# Patient Record
Sex: FEMALE | Race: WHITE | URBAN - METROPOLITAN AREA
[De-identification: names, ages, dates, MRNs, and addresses within clinical notes are randomized per-mention and may not be internally consistent; named-entity substitution may affect disease eponyms.]

---

## 2017-04-20 ENCOUNTER — TELEPHONE (OUTPATIENT)
Dept: INTERNAL MEDICINE | Age: 52
End: 2017-04-20

## 2017-04-20 RX ORDER — AMOXICILLIN AND CLAVULANATE POTASSIUM 875; 125 MG/1; MG/1
1 TABLET, FILM COATED ORAL 2 TIMES DAILY
Qty: 20 TABLET | Refills: 0 | Status: SHIPPED | OUTPATIENT
Start: 2017-04-20 | End: 2017-04-30

## 2017-06-02 ENCOUNTER — TELEPHONE (OUTPATIENT)
Dept: INTERNAL MEDICINE | Age: 52
End: 2017-06-02

## 2017-06-06 ENCOUNTER — OFFICE VISIT (OUTPATIENT)
Dept: INTERNAL MEDICINE | Age: 52
End: 2017-06-06

## 2017-06-06 VITALS
HEIGHT: 65 IN | HEART RATE: 80 BPM | SYSTOLIC BLOOD PRESSURE: 108 MMHG | BODY MASS INDEX: 31.32 KG/M2 | WEIGHT: 188 LBS | DIASTOLIC BLOOD PRESSURE: 80 MMHG

## 2017-06-06 DIAGNOSIS — F43.9 STRESS: ICD-10-CM

## 2017-06-06 DIAGNOSIS — M94.262 CHONDROMALACIA, LEFT KNEE: ICD-10-CM

## 2017-06-06 DIAGNOSIS — N95.1 MENOPAUSAL SYMPTOMS: ICD-10-CM

## 2017-06-06 DIAGNOSIS — K64.9 HEMORRHOIDS, UNSPECIFIED HEMORRHOID TYPE: ICD-10-CM

## 2017-06-06 PROCEDURE — 99213 OFFICE O/P EST LOW 20 MIN: CPT | Performed by: INTERNAL MEDICINE

## 2017-06-06 RX ORDER — CITALOPRAM 20 MG/1
TABLET ORAL
Qty: 90 TABLET | Refills: 3 | Status: SHIPPED | OUTPATIENT
Start: 2017-06-06 | End: 2018-06-12 | Stop reason: SDUPTHER

## 2017-06-06 ASSESSMENT — PATIENT HEALTH QUESTIONNAIRE - PHQ9
2. FEELING DOWN, DEPRESSED OR HOPELESS: 0
SUM OF ALL RESPONSES TO PHQ QUESTIONS 1-9: 0
SUM OF ALL RESPONSES TO PHQ9 QUESTIONS 1 & 2: 0
1. LITTLE INTEREST OR PLEASURE IN DOING THINGS: 0

## 2017-08-23 ENCOUNTER — OFFICE VISIT (OUTPATIENT)
Dept: INTERNAL MEDICINE | Age: 52
End: 2017-08-23

## 2017-08-23 VITALS
WEIGHT: 191 LBS | HEART RATE: 68 BPM | SYSTOLIC BLOOD PRESSURE: 108 MMHG | BODY MASS INDEX: 31.82 KG/M2 | DIASTOLIC BLOOD PRESSURE: 68 MMHG | HEIGHT: 65 IN

## 2017-08-23 DIAGNOSIS — J45.20 MILD INTERMITTENT ASTHMA WITHOUT COMPLICATION: ICD-10-CM

## 2017-08-23 DIAGNOSIS — M94.262 CHONDROMALACIA, LEFT KNEE: ICD-10-CM

## 2017-08-23 DIAGNOSIS — F43.9 STRESS: ICD-10-CM

## 2017-08-23 DIAGNOSIS — N95.1 MENOPAUSAL SYMPTOMS: ICD-10-CM

## 2017-08-23 DIAGNOSIS — Z00.00 PREVENTATIVE HEALTH CARE: Primary | ICD-10-CM

## 2017-08-23 DIAGNOSIS — Z23 NEED FOR TDAP VACCINATION: ICD-10-CM

## 2017-08-23 PROCEDURE — 90471 IMMUNIZATION ADMIN: CPT | Performed by: INTERNAL MEDICINE

## 2017-08-23 PROCEDURE — 90715 TDAP VACCINE 7 YRS/> IM: CPT | Performed by: INTERNAL MEDICINE

## 2017-08-23 PROCEDURE — 99396 PREV VISIT EST AGE 40-64: CPT | Performed by: INTERNAL MEDICINE

## 2017-08-23 ASSESSMENT — ENCOUNTER SYMPTOMS
BACK PAIN: 0
GASTROINTESTINAL NEGATIVE: 1
RESPIRATORY NEGATIVE: 1
ALLERGIC/IMMUNOLOGIC NEGATIVE: 1

## 2017-11-09 ENCOUNTER — TELEPHONE (OUTPATIENT)
Dept: INTERNAL MEDICINE | Age: 52
End: 2017-11-09

## 2017-11-09 RX ORDER — PREDNISONE 10 MG/1
TABLET ORAL
Qty: 22 TABLET | Refills: 0 | Status: SHIPPED | OUTPATIENT
Start: 2017-11-09 | End: 2019-07-10

## 2017-11-09 RX ORDER — TRIAMCINOLONE ACETONIDE 5 MG/G
CREAM TOPICAL
Qty: 30 G | Refills: 0 | Status: SHIPPED | OUTPATIENT
Start: 2017-11-09 | End: 2017-11-16

## 2017-11-20 ENCOUNTER — TELEPHONE (OUTPATIENT)
Dept: INTERNAL MEDICINE | Age: 52
End: 2017-11-20

## 2017-11-20 RX ORDER — BETAMETHASONE DIPROPIONATE 0.05 %
OINTMENT (GRAM) TOPICAL
Qty: 30 G | Refills: 0 | Status: SHIPPED | OUTPATIENT
Start: 2017-11-20 | End: 2019-07-10

## 2017-11-20 RX ORDER — PREDNISONE 10 MG/1
TABLET ORAL
Qty: 30 TABLET | Refills: 0 | Status: SHIPPED | OUTPATIENT
Start: 2017-11-20 | End: 2019-07-10

## 2017-11-20 NOTE — TELEPHONE ENCOUNTER
Prednisone 10 mg   #30     4 daily ×3 days then 3 daily ×3 days and 2 daily ×3 days and one daily till gone.   Diprolene AF cream         30 g           apply twice a day to affected areas

## 2017-12-05 ENCOUNTER — TELEPHONE (OUTPATIENT)
Dept: INTERNAL MEDICINE | Age: 52
End: 2017-12-05

## 2017-12-05 NOTE — TELEPHONE ENCOUNTER
Poison ivy should not be lasting this long. We'll give one more course of a topical steroid if no better she will have to see dermatology.   Lidex 0.1% cream        15 g            apply twice a day

## 2018-06-12 RX ORDER — CITALOPRAM 20 MG/1
TABLET ORAL
Qty: 30 TABLET | Refills: 2 | Status: SHIPPED | OUTPATIENT
Start: 2018-06-12 | End: 2018-09-21 | Stop reason: SDUPTHER

## 2018-09-21 RX ORDER — CITALOPRAM 20 MG/1
TABLET ORAL
Qty: 30 TABLET | Refills: 1 | Status: SHIPPED | OUTPATIENT
Start: 2018-09-21 | End: 2018-11-21 | Stop reason: SDUPTHER

## 2018-09-26 PROBLEM — Z00.00 PREVENTATIVE HEALTH CARE: Status: RESOLVED | Noted: 2017-08-23 | Resolved: 2018-09-26

## 2018-11-21 ENCOUNTER — TELEPHONE (OUTPATIENT)
Dept: INTERNAL MEDICINE CLINIC | Age: 53
End: 2018-11-21

## 2018-11-21 RX ORDER — CITALOPRAM 20 MG/1
TABLET ORAL
Qty: 30 TABLET | Refills: 3 | Status: SHIPPED | OUTPATIENT
Start: 2018-11-21 | End: 2019-08-13

## 2018-11-21 RX ORDER — CITALOPRAM 20 MG/1
TABLET ORAL
Qty: 30 TABLET | Refills: 3 | Status: SHIPPED | OUTPATIENT
Start: 2018-11-21 | End: 2018-11-21 | Stop reason: SDUPTHER

## 2019-04-30 ENCOUNTER — TELEPHONE (OUTPATIENT)
Dept: INTERNAL MEDICINE CLINIC | Age: 54
End: 2019-04-30

## 2019-04-30 RX ORDER — NEOMYCIN/POLYMYXIN B/HYDROCORT 3.5-10K-1
SUSPENSION, DROPS(FINAL DOSAGE FORM)(ML) OPHTHALMIC (EYE)
Qty: 7.5 ML | Refills: 0 | Status: SHIPPED | OUTPATIENT
Start: 2019-04-30 | End: 2019-07-10

## 2019-04-30 NOTE — TELEPHONE ENCOUNTER
Pt states she got dog poop in her eye yesterday, she flushed it really good but she has to go out of town tomorrow and is requesting a prescription for eye drops to prevent infection. She states she feels its  irritated.

## 2019-07-10 ENCOUNTER — OFFICE VISIT (OUTPATIENT)
Dept: INTERNAL MEDICINE CLINIC | Age: 54
End: 2019-07-10
Payer: COMMERCIAL

## 2019-07-10 VITALS
WEIGHT: 177 LBS | DIASTOLIC BLOOD PRESSURE: 80 MMHG | OXYGEN SATURATION: 98 % | HEART RATE: 78 BPM | HEIGHT: 65 IN | BODY MASS INDEX: 29.49 KG/M2 | SYSTOLIC BLOOD PRESSURE: 110 MMHG

## 2019-07-10 DIAGNOSIS — Z00.00 PREVENTATIVE HEALTH CARE: ICD-10-CM

## 2019-07-10 DIAGNOSIS — K64.9 HEMORRHOIDS, UNSPECIFIED HEMORRHOID TYPE: ICD-10-CM

## 2019-07-10 DIAGNOSIS — F43.9 STRESS: ICD-10-CM

## 2019-07-10 DIAGNOSIS — J45.909 UNCOMPLICATED ASTHMA, UNSPECIFIED ASTHMA SEVERITY, UNSPECIFIED WHETHER PERSISTENT: ICD-10-CM

## 2019-07-10 DIAGNOSIS — Z00.00 PREVENTATIVE HEALTH CARE: Primary | ICD-10-CM

## 2019-07-10 DIAGNOSIS — L23.7 POISON IVY DERMATITIS: ICD-10-CM

## 2019-07-10 DIAGNOSIS — E66.9 OBESITY (BMI 30-39.9): ICD-10-CM

## 2019-07-10 PROBLEM — M94.262 CHONDROMALACIA, LEFT KNEE: Status: RESOLVED | Noted: 2017-06-06 | Resolved: 2019-07-10

## 2019-07-10 LAB
A/G RATIO: 2.2 (ref 1.1–2.2)
ALBUMIN SERPL-MCNC: 4.4 G/DL (ref 3.4–5)
ALP BLD-CCNC: 58 U/L (ref 40–129)
ALT SERPL-CCNC: 10 U/L (ref 10–40)
ANION GAP SERPL CALCULATED.3IONS-SCNC: 11 MMOL/L (ref 3–16)
AST SERPL-CCNC: 12 U/L (ref 15–37)
BASOPHILS ABSOLUTE: 0.1 K/UL (ref 0–0.2)
BASOPHILS RELATIVE PERCENT: 1.2 %
BILIRUB SERPL-MCNC: 0.5 MG/DL (ref 0–1)
BILIRUBIN URINE: NEGATIVE
BLOOD, URINE: NEGATIVE
BUN BLDV-MCNC: 16 MG/DL (ref 7–20)
CALCIUM SERPL-MCNC: 9.9 MG/DL (ref 8.3–10.6)
CHLORIDE BLD-SCNC: 104 MMOL/L (ref 99–110)
CHOLESTEROL, TOTAL: 171 MG/DL (ref 0–199)
CLARITY: CLEAR
CO2: 24 MMOL/L (ref 21–32)
COLOR: YELLOW
CREAT SERPL-MCNC: <0.5 MG/DL (ref 0.6–1.1)
EOSINOPHILS ABSOLUTE: 0.1 K/UL (ref 0–0.6)
EOSINOPHILS RELATIVE PERCENT: 3 %
EPITHELIAL CELLS, UA: 9 /HPF (ref 0–5)
GFR AFRICAN AMERICAN: >60
GFR NON-AFRICAN AMERICAN: >60
GLOBULIN: 2 G/DL
GLUCOSE BLD-MCNC: 85 MG/DL (ref 70–99)
GLUCOSE URINE: NEGATIVE MG/DL
HCT VFR BLD CALC: 40.2 % (ref 36–48)
HDLC SERPL-MCNC: 63 MG/DL (ref 40–60)
HEMOGLOBIN: 13 G/DL (ref 12–16)
HYALINE CASTS: 1 /LPF (ref 0–8)
KETONES, URINE: NEGATIVE MG/DL
LDL CHOLESTEROL CALCULATED: 94 MG/DL
LEUKOCYTE ESTERASE, URINE: ABNORMAL
LYMPHOCYTES ABSOLUTE: 1.3 K/UL (ref 1–5.1)
LYMPHOCYTES RELATIVE PERCENT: 31.9 %
MCH RBC QN AUTO: 30.1 PG (ref 26–34)
MCHC RBC AUTO-ENTMCNC: 32.3 G/DL (ref 31–36)
MCV RBC AUTO: 93 FL (ref 80–100)
MICROSCOPIC EXAMINATION: YES
MONOCYTES ABSOLUTE: 0.4 K/UL (ref 0–1.3)
MONOCYTES RELATIVE PERCENT: 9.3 %
NEUTROPHILS ABSOLUTE: 2.3 K/UL (ref 1.7–7.7)
NEUTROPHILS RELATIVE PERCENT: 54.6 %
NITRITE, URINE: NEGATIVE
PDW BLD-RTO: 14.2 % (ref 12.4–15.4)
PH UA: 5.5 (ref 5–8)
PLATELET # BLD: 239 K/UL (ref 135–450)
PMV BLD AUTO: 9 FL (ref 5–10.5)
POTASSIUM SERPL-SCNC: 4.5 MMOL/L (ref 3.5–5.1)
PROTEIN UA: NEGATIVE MG/DL
RBC # BLD: 4.32 M/UL (ref 4–5.2)
RBC UA: 2 /HPF (ref 0–4)
SODIUM BLD-SCNC: 139 MMOL/L (ref 136–145)
SPECIFIC GRAVITY UA: 1.02 (ref 1–1.03)
TOTAL PROTEIN: 6.4 G/DL (ref 6.4–8.2)
TRIGL SERPL-MCNC: 69 MG/DL (ref 0–150)
TSH REFLEX FT4: 2.78 UIU/ML (ref 0.27–4.2)
URINE REFLEX TO CULTURE: YES
URINE TYPE: ABNORMAL
UROBILINOGEN, URINE: 0.2 E.U./DL
VLDLC SERPL CALC-MCNC: 14 MG/DL
WBC # BLD: 4.2 K/UL (ref 4–11)
WBC UA: 1 /HPF (ref 0–5)

## 2019-07-10 PROCEDURE — 99396 PREV VISIT EST AGE 40-64: CPT | Performed by: INTERNAL MEDICINE

## 2019-07-10 RX ORDER — BETAMETHASONE DIPROPIONATE 0.5 MG/G
CREAM TOPICAL 2 TIMES DAILY
Qty: 15 G | Refills: 1 | Status: SHIPPED | OUTPATIENT
Start: 2019-07-10 | End: 2022-03-01 | Stop reason: ALTCHOICE

## 2019-07-10 ASSESSMENT — PATIENT HEALTH QUESTIONNAIRE - PHQ9
2. FEELING DOWN, DEPRESSED OR HOPELESS: 0
1. LITTLE INTEREST OR PLEASURE IN DOING THINGS: 0
SUM OF ALL RESPONSES TO PHQ QUESTIONS 1-9: 0
SUM OF ALL RESPONSES TO PHQ QUESTIONS 1-9: 0
SUM OF ALL RESPONSES TO PHQ9 QUESTIONS 1 & 2: 0

## 2019-07-10 ASSESSMENT — ENCOUNTER SYMPTOMS: RESPIRATORY NEGATIVE: 1

## 2019-07-10 NOTE — PROGRESS NOTES
SUBJECTIVE:  Patient ID: Hadley Mejia is an 48 y.o. female. HPI: Patient here today for the f/u of chronic problems-- see Problem List and associated comments. New issues or complaints include (alsosee Assessment for more details): Patient here for routine follow-up. 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 Her main stress revolves around her daughter who is again in the hospital.  She has managed to lose some weight on her own but she has plateaued. Physically she feels fine except for some hemorrhoid issues. She is also had a recent recurrent bout of poison ivy contact dermatitis. Review of Systems   Constitutional: Negative for activity change, appetite change and unexpected weight change. HENT: Negative. Eyes: Negative for visual disturbance. Respiratory: Negative. Cardiovascular: Negative. Gastrointestinal:        Hemorrhoids   Genitourinary: Negative. Musculoskeletal: Negative. Skin: Positive for rash. Allergic/Immunologic: Positive for environmental allergies. Neurological: Negative. Hematological: Negative. Psychiatric/Behavioral: Negative for dysphoric mood, sleep disturbance and suicidal ideas. OBJECTIVE:    /80 (Site: Left Upper Arm, Position: Sitting, Cuff Size: Large Adult)   Pulse 78   Ht 5' 5\" (1.651 m)   Wt 177 lb (80.3 kg)   SpO2 98%   BMI 29.45 kg/m²      Physical Exam   Constitutional: She is oriented to person, place, and time. She appears well-developed and well-nourished. No distress. overweight    HENT:   Head: Normocephalic and atraumatic. Right Ear: External ear normal.   Left Ear: External ear normal.   Mouth/Throat: Oropharynx is clear and moist. No oropharyngeal exudate. Eyes: Pupils are equal, round, and reactive to light. EOM are normal. Right eye exhibits no discharge. Left eye exhibits no discharge. No scleral icterus. Neck: Normal range of motion. Neck supple. No JVD present.  Carotid bruit is asthma severity, unspecified whether persistent     Stress     Poison ivy dermatitis     Obesity (BMI 30-39. 9)        Preventative health care  Here for yearly checkup. Will get labs done today. Social alcohol use. Non-smoker. Due for colonoscopy. She has routine gynecologic checkups. Hemorrhoids  Refer to colorectal surgery for opinion. She may also get her colonoscopy here as well. Asthma  Asymptomatic. No treatment. Stress  Her stress mostly revolves around her daughter. She is rehospitalized at this time. Poison ivy dermatitis  Right arm-topical Rx. Diprolene cream.    Obesity (BMI 30-39. 9)  She has managed to lose some weight on her own but she has now plateaued. Refer to our dietitian for further evaluation. PLAN:See ASSESSMENT for evaluation & PLAN     Orders Placed This Encounter   Procedures    CBC Auto Differential     Standing Status:   Future     Standing Expiration Date:   7/9/2020    Comprehensive Metabolic Panel     Standing Status:   Future     Standing Expiration Date:   7/9/2020    Lipid Panel     Standing Status:   Future     Standing Expiration Date:   7/9/2020     Order Specific Question:   Is Patient Fasting?/# of Hours     Answer:   yes - 8 hours    TSH WITH REFLEX TO FT4     Standing Status:   Future     Standing Expiration Date:   7/9/2020    Urinalysis Reflex to Culture     Standing Status:   Future     Standing Expiration Date:   7/10/2020     Order Specific Question:   SPECIFY(EX-CATH,MIDSTREAM,CYSTO,ETC)? Answer:   RAO       PSH, PMH, SH and FH reviewed and noted. Recent and past labs, tests and consultsalso reviewed. Recent or new meds also reviewed.

## 2019-07-12 LAB — URINE CULTURE, ROUTINE: NORMAL

## 2019-08-09 PROBLEM — Z00.00 PREVENTATIVE HEALTH CARE: Status: RESOLVED | Noted: 2017-08-23 | Resolved: 2019-08-09

## 2019-08-13 RX ORDER — CITALOPRAM 20 MG/1
TABLET ORAL
Qty: 30 TABLET | Refills: 2 | Status: SHIPPED | OUTPATIENT
Start: 2019-08-13 | End: 2019-11-18 | Stop reason: SDUPTHER

## 2019-11-18 RX ORDER — CITALOPRAM 20 MG/1
TABLET ORAL
Qty: 30 TABLET | Refills: 1 | Status: SHIPPED | OUTPATIENT
Start: 2019-11-18 | End: 2020-01-22

## 2020-08-10 RX ORDER — CITALOPRAM 20 MG/1
TABLET ORAL
Qty: 30 TABLET | Refills: 0 | Status: SHIPPED | OUTPATIENT
Start: 2020-08-10 | End: 2021-02-26 | Stop reason: SDUPTHER

## 2020-08-10 RX ORDER — CITALOPRAM 20 MG/1
TABLET ORAL
Qty: 30 TABLET | Refills: 5 | Status: SHIPPED | OUTPATIENT
Start: 2020-08-10 | End: 2021-03-29

## 2020-08-10 NOTE — TELEPHONE ENCOUNTER
med refill request, the patient advise she is out and needs refill before she leaves town on 8/13    citalopram (CELEXA) 20 MG tablet     [address], [place] - [address] [facility] - [place] [phone]

## 2021-02-26 RX ORDER — CITALOPRAM 20 MG/1
TABLET ORAL
Qty: 30 TABLET | Refills: 0 | Status: SHIPPED
Start: 2021-02-26 | End: 2021-03-11 | Stop reason: SDUPTHER

## 2021-02-26 RX ORDER — CITALOPRAM 20 MG/1
TABLET ORAL
Qty: 30 TABLET | Refills: 4 | OUTPATIENT
Start: 2021-02-26

## 2021-03-11 ENCOUNTER — OFFICE VISIT (OUTPATIENT)
Dept: INTERNAL MEDICINE CLINIC | Age: 56
End: 2021-03-11
Payer: COMMERCIAL

## 2021-03-11 VITALS
BODY MASS INDEX: 25.36 KG/M2 | SYSTOLIC BLOOD PRESSURE: 130 MMHG | HEIGHT: 65 IN | TEMPERATURE: 96.9 F | WEIGHT: 152.2 LBS | DIASTOLIC BLOOD PRESSURE: 68 MMHG

## 2021-03-11 DIAGNOSIS — F43.9 STRESS: ICD-10-CM

## 2021-03-11 DIAGNOSIS — E66.9 OBESITY (BMI 30-39.9): ICD-10-CM

## 2021-03-11 DIAGNOSIS — J45.909 UNCOMPLICATED ASTHMA, UNSPECIFIED ASTHMA SEVERITY, UNSPECIFIED WHETHER PERSISTENT: ICD-10-CM

## 2021-03-11 DIAGNOSIS — M53.3 COCCYDYNIA: ICD-10-CM

## 2021-03-11 DIAGNOSIS — Z00.00 PREVENTATIVE HEALTH CARE: Primary | ICD-10-CM

## 2021-03-11 DIAGNOSIS — M51.9 LUMBAR DISC DISEASE: ICD-10-CM

## 2021-03-11 DIAGNOSIS — Z00.00 PREVENTATIVE HEALTH CARE: ICD-10-CM

## 2021-03-11 PROBLEM — L23.7 POISON IVY DERMATITIS: Status: RESOLVED | Noted: 2019-07-10 | Resolved: 2021-03-11

## 2021-03-11 LAB
A/G RATIO: 2 (ref 1.1–2.2)
ALBUMIN SERPL-MCNC: 4.5 G/DL (ref 3.4–5)
ALP BLD-CCNC: 63 U/L (ref 40–129)
ALT SERPL-CCNC: 12 U/L (ref 10–40)
ANION GAP SERPL CALCULATED.3IONS-SCNC: 9 MMOL/L (ref 3–16)
AST SERPL-CCNC: 14 U/L (ref 15–37)
BASOPHILS ABSOLUTE: 0.1 K/UL (ref 0–0.2)
BASOPHILS RELATIVE PERCENT: 1.9 %
BILIRUB SERPL-MCNC: 0.4 MG/DL (ref 0–1)
BILIRUBIN URINE: NEGATIVE
BLOOD, URINE: NEGATIVE
BUN BLDV-MCNC: 18 MG/DL (ref 7–20)
CALCIUM SERPL-MCNC: 9.6 MG/DL (ref 8.3–10.6)
CHLORIDE BLD-SCNC: 103 MMOL/L (ref 99–110)
CHOLESTEROL, TOTAL: 183 MG/DL (ref 0–199)
CLARITY: CLEAR
CO2: 28 MMOL/L (ref 21–32)
COLOR: YELLOW
CREAT SERPL-MCNC: 0.6 MG/DL (ref 0.6–1.1)
EOSINOPHILS ABSOLUTE: 0.1 K/UL (ref 0–0.6)
EOSINOPHILS RELATIVE PERCENT: 3.8 %
GFR AFRICAN AMERICAN: >60
GFR NON-AFRICAN AMERICAN: >60
GLOBULIN: 2.3 G/DL
GLUCOSE BLD-MCNC: 67 MG/DL (ref 70–99)
GLUCOSE URINE: NEGATIVE MG/DL
HCT VFR BLD CALC: 42.2 % (ref 36–48)
HDLC SERPL-MCNC: 58 MG/DL (ref 40–60)
HEMOGLOBIN: 13.9 G/DL (ref 12–16)
KETONES, URINE: NEGATIVE MG/DL
LDL CHOLESTEROL CALCULATED: 100 MG/DL
LEUKOCYTE ESTERASE, URINE: NEGATIVE
LYMPHOCYTES ABSOLUTE: 1.3 K/UL (ref 1–5.1)
LYMPHOCYTES RELATIVE PERCENT: 32.9 %
MCH RBC QN AUTO: 30.4 PG (ref 26–34)
MCHC RBC AUTO-ENTMCNC: 33 G/DL (ref 31–36)
MCV RBC AUTO: 92 FL (ref 80–100)
MICROSCOPIC EXAMINATION: NORMAL
MONOCYTES ABSOLUTE: 0.3 K/UL (ref 0–1.3)
MONOCYTES RELATIVE PERCENT: 7.5 %
NEUTROPHILS ABSOLUTE: 2.1 K/UL (ref 1.7–7.7)
NEUTROPHILS RELATIVE PERCENT: 53.9 %
NITRITE, URINE: NEGATIVE
PDW BLD-RTO: 13.3 % (ref 12.4–15.4)
PH UA: 5.5 (ref 5–8)
PLATELET # BLD: 269 K/UL (ref 135–450)
PMV BLD AUTO: 8.8 FL (ref 5–10.5)
POTASSIUM SERPL-SCNC: 4.7 MMOL/L (ref 3.5–5.1)
PROTEIN UA: NEGATIVE MG/DL
RBC # BLD: 4.58 M/UL (ref 4–5.2)
SODIUM BLD-SCNC: 140 MMOL/L (ref 136–145)
SPECIFIC GRAVITY UA: 1.02 (ref 1–1.03)
TOTAL PROTEIN: 6.8 G/DL (ref 6.4–8.2)
TRIGL SERPL-MCNC: 126 MG/DL (ref 0–150)
TSH REFLEX FT4: 2.02 UIU/ML (ref 0.27–4.2)
URINE REFLEX TO CULTURE: NORMAL
URINE TYPE: NORMAL
UROBILINOGEN, URINE: 0.2 E.U./DL
VLDLC SERPL CALC-MCNC: 25 MG/DL
WBC # BLD: 3.9 K/UL (ref 4–11)

## 2021-03-11 PROCEDURE — 99396 PREV VISIT EST AGE 40-64: CPT | Performed by: INTERNAL MEDICINE

## 2021-03-11 SDOH — ECONOMIC STABILITY: FOOD INSECURITY: WITHIN THE PAST 12 MONTHS, YOU WORRIED THAT YOUR FOOD WOULD RUN OUT BEFORE YOU GOT MONEY TO BUY MORE.: NEVER TRUE

## 2021-03-11 SDOH — ECONOMIC STABILITY: TRANSPORTATION INSECURITY
IN THE PAST 12 MONTHS, HAS LACK OF TRANSPORTATION KEPT YOU FROM MEETINGS, WORK, OR FROM GETTING THINGS NEEDED FOR DAILY LIVING?: NO

## 2021-03-11 SDOH — ECONOMIC STABILITY: FOOD INSECURITY: WITHIN THE PAST 12 MONTHS, THE FOOD YOU BOUGHT JUST DIDN'T LAST AND YOU DIDN'T HAVE MONEY TO GET MORE.: NEVER TRUE

## 2021-03-11 SDOH — ECONOMIC STABILITY: TRANSPORTATION INSECURITY
IN THE PAST 12 MONTHS, HAS THE LACK OF TRANSPORTATION KEPT YOU FROM MEDICAL APPOINTMENTS OR FROM GETTING MEDICATIONS?: NO

## 2021-03-11 SDOH — ECONOMIC STABILITY: INCOME INSECURITY: HOW HARD IS IT FOR YOU TO PAY FOR THE VERY BASICS LIKE FOOD, HOUSING, MEDICAL CARE, AND HEATING?: NOT HARD AT ALL

## 2021-03-11 ASSESSMENT — PATIENT HEALTH QUESTIONNAIRE - PHQ9
SUM OF ALL RESPONSES TO PHQ9 QUESTIONS 1 & 2: 0
2. FEELING DOWN, DEPRESSED OR HOPELESS: 0
SUM OF ALL RESPONSES TO PHQ QUESTIONS 1-9: 0
1. LITTLE INTEREST OR PLEASURE IN DOING THINGS: 0

## 2021-03-11 ASSESSMENT — ENCOUNTER SYMPTOMS
GASTROINTESTINAL NEGATIVE: 1
ALLERGIC/IMMUNOLOGIC NEGATIVE: 1
RESPIRATORY NEGATIVE: 1

## 2021-03-11 NOTE — ASSESSMENT & PLAN NOTE
Routine checkup. Healthy during the pandemic. She is been on the [employer] and she lost a significant amount of weight. She is doing her best to maintain a. She walks daily. Up-to-date for gynecology checkups. She needs colonoscopy. She is trying to get Covid vaccine.

## 2021-03-11 NOTE — PROGRESS NOTES
SUBJECTIVE:  Patient ID: Adrien Ramirez is an 50 y.o. female. HPI: Patient here today for the f/u of chronic problems-- see Problem List and associated comments. New issues or complaints include (also see Assessment for more details): Here for yearly checkup. Overall she is doing well. She has been healthy during the pandemic. She is looking to get Covid vaccination. Her work was slowed down during the pandemic but is starting to . She went on the [employer] and lost a significant amount of weight. Last couple weeks she has had some pain in her lower tailbone. No trauma. No new medications. She is overdue for colonoscopy. Review of Systems   Constitutional: Positive for unexpected weight change (Intentional weight loss). Negative for fatigue and fever. HENT: Negative. Eyes: Negative for visual disturbance. Respiratory: Negative. Cardiovascular: Negative. Gastrointestinal: Negative. Genitourinary: Negative. Musculoskeletal:        Coccyx pain   Skin: Negative for rash. Allergic/Immunologic: Negative. Neurological: Negative. Hematological: Negative. Psychiatric/Behavioral: Negative. Negative for dysphoric mood. OBJECTIVE:    /68 (Site: Left Upper Arm)   Temp 96.9 °F (36.1 °C)   Ht 5' 5\" (1.651 m)   Wt 152 lb 3.2 oz (69 kg)   BMI 25.33 kg/m²      Physical Exam  Constitutional:       General: She is not in acute distress. Appearance: She is well-developed. She is not diaphoretic. HENT:      Head: Normocephalic and atraumatic. Right Ear: External ear normal.      Left Ear: External ear normal.   Eyes:      General: No scleral icterus. Right eye: No discharge. Left eye: No discharge. Pupils: Pupils are equal, round, and reactive to light. Neck:      Musculoskeletal: Normal range of motion and neck supple. Thyroid: No thyromegaly. Vascular: No carotid bruit or JVD. Trachea: No tracheal deviation. Cardiovascular:      Rate and Rhythm: Normal rate and regular rhythm. Pulses:           Carotid pulses are 2+ on the right side and 2+ on the left side. Radial pulses are 2+ on the right side and 2+ on the left side. Posterior tibial pulses are 2+ on the right side and 2+ on the left side. Heart sounds: Normal heart sounds. No murmur. No gallop. Pulmonary:      Effort: Pulmonary effort is normal. No respiratory distress. Breath sounds: Normal breath sounds. No stridor. No wheezing or rales. Abdominal:      General: Bowel sounds are normal. There is no distension or abdominal bruit. Palpations: Abdomen is soft. There is no mass. Tenderness: There is no abdominal tenderness. Musculoskeletal:      Right lower leg: No edema. Left lower leg: No edema. Comments: Tender over lower sacrum/coccyx   Lymphadenopathy:      Head:      Right side of head: No submandibular adenopathy. Left side of head: No submandibular adenopathy. Cervical: No cervical adenopathy. Upper Body:      Right upper body: No supraclavicular adenopathy. Left upper body: No supraclavicular adenopathy. Skin:     Coloration: Skin is not jaundiced or pale. Findings: No rash. Comments: IV type rash on right forearm   Neurological:      General: No focal deficit present. Mental Status: She is alert and oriented to person, place, and time. Cranial Nerves: Cranial nerves are intact. No cranial nerve deficit. Motor: Motor function is intact. No tremor or abnormal muscle tone. Coordination: Coordination is intact. Coordination normal.      Gait: Gait is intact. Gait normal.      Deep Tendon Reflexes: Reflexes normal.      Reflex Scores:       Bicep reflexes are 2+ on the right side and 2+ on the left side. Patellar reflexes are 2+ on the right side and 2+ on the left side.      Comments: No focal signs  SLR negative   Psychiatric:         Attention and Perception: Attention normal.         Mood and Affect: Mood normal.         Speech: Speech normal.         Behavior: Behavior normal.         Thought Content: Thought content normal.         Cognition and Memory: Cognition normal.         Judgment: Judgment normal.         ASSESSMENT:       Encounter Diagnoses   Name Primary?  Preventative health care Yes    Coccydynia     Stress     Uncomplicated asthma, unspecified asthma severity, unspecified whether persistent     Lumbar disc disease     Obesity (BMI 30-39. 9)        Preventative health care  Routine checkup. Healthy during the pandemic. She is been on the [employer] and she lost a significant amount of weight. She is doing her best to maintain a. She walks daily. Up-to-date for gynecology checkups. She needs colonoscopy. She is trying to get Covid vaccine. Coccydynia  No trauma. 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 Recommend soft chair and doughnut. If no better can x-ray or further evaluate. Stress  Still has the same stress at home with her daughter. Essentially unchanged. Asthma  Asymptomatic    Lumbar disc disease  New coccydynia pain. Suspect unrelated to her lumbar disease. No radicular symptoms. Obesity (BMI 30-39. 9)  She has lost approximately 40 pounds with the [employer]. She is working hard to keep the weight off.         PLAN:See ASSESSMENT for evaluation & PLAN     Orders Placed This Encounter   Procedures    CBC Auto Differential     Standing Status:   Future     Standing Expiration Date:   3/11/2022    Comprehensive Metabolic Panel     Standing Status:   Future     Standing Expiration Date:   3/11/2022    Lipid Panel     Standing Status:   Future     Standing Expiration Date:   3/11/2022     Order Specific Question:   Is Patient Fasting?/# of Hours     Answer:   yes - 8 hours    Hemoglobin A1C     Standing Status:   Future Standing Expiration Date:   3/11/2022    TSH WITH REFLEX TO FT4     Standing Status:   Future     Standing Expiration Date:   3/11/2022    Urinalysis Reflex to Culture     Standing Status:   Future     Standing Expiration Date:   3/11/2022     Order Specific Question:   SPECIFY(EX-CATH,MIDSTREAM,CYSTO,ETC)? Answer:   NA     , PMH, SH and FH reviewed and noted. Recent and past labs, tests and consultsalso reviewed. Recent or new meds also reviewed.

## 2021-03-11 NOTE — ASSESSMENT & PLAN NOTE
She has lost approximately 40 pounds with the [employer]. She is working hard to keep the weight off.

## 2021-03-11 NOTE — ASSESSMENT & PLAN NOTE
No trauma. 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 Recommend soft chair and doughnut. If no better can x-ray or further evaluate.

## 2021-03-12 LAB
ESTIMATED AVERAGE GLUCOSE: 99.7 MG/DL
HBA1C MFR BLD: 5.1 %

## 2021-03-16 ENCOUNTER — IMMUNIZATION (OUTPATIENT)
Dept: FAMILY MEDICINE CLINIC | Age: 56
End: 2021-03-16
Payer: COMMERCIAL

## 2021-03-16 PROCEDURE — 91300 COVID-19, PFIZER VACCINE 30MCG/0.3ML DOSE: CPT | Performed by: FAMILY MEDICINE

## 2021-03-16 PROCEDURE — 0001A COVID-19, PFIZER VACCINE 30MCG/0.3ML DOSE: CPT | Performed by: FAMILY MEDICINE

## 2021-03-29 RX ORDER — CITALOPRAM 20 MG/1
TABLET ORAL
Qty: 30 TABLET | Refills: 4 | Status: SHIPPED | OUTPATIENT
Start: 2021-03-29 | End: 2021-09-07

## 2021-04-06 ENCOUNTER — IMMUNIZATION (OUTPATIENT)
Dept: FAMILY MEDICINE CLINIC | Age: 56
End: 2021-04-06
Payer: COMMERCIAL

## 2021-04-08 PROCEDURE — 91300 COVID-19, PFIZER VACCINE 30MCG/0.3ML DOSE: CPT | Performed by: FAMILY MEDICINE

## 2021-04-08 PROCEDURE — 0002A COVID-19, PFIZER VACCINE 30MCG/0.3ML DOSE: CPT | Performed by: FAMILY MEDICINE

## 2021-04-10 PROBLEM — Z00.00 PREVENTATIVE HEALTH CARE: Status: RESOLVED | Noted: 2017-08-23 | Resolved: 2021-04-10

## 2021-05-18 ENCOUNTER — TELEPHONE (OUTPATIENT)
Dept: INTERNAL MEDICINE CLINIC | Age: 56
End: 2021-05-18

## 2021-05-18 DIAGNOSIS — M54.50 LOW BACK PAIN, UNSPECIFIED BACK PAIN LATERALITY, UNSPECIFIED CHRONICITY, UNSPECIFIED WHETHER SCIATICA PRESENT: Primary | ICD-10-CM

## 2021-05-18 NOTE — TELEPHONE ENCOUNTER
Patient called asking if you could give her an order to get an x-ray of her lower back? Please call to advise.

## 2021-06-08 ENCOUNTER — HOSPITAL ENCOUNTER (OUTPATIENT)
Dept: GENERAL RADIOLOGY | Age: 56
Discharge: HOME | End: 2021-06-08
Payer: COMMERCIAL

## 2021-06-08 ENCOUNTER — TELEPHONE (OUTPATIENT)
Dept: INTERNAL MEDICINE CLINIC | Age: 56
End: 2021-06-08

## 2021-06-08 ENCOUNTER — HOSPITAL ENCOUNTER (OUTPATIENT)
Age: 56
Discharge: HOME | End: 2021-06-08
Payer: COMMERCIAL

## 2021-06-08 DIAGNOSIS — M54.50 LOW BACK PAIN, UNSPECIFIED BACK PAIN LATERALITY, UNSPECIFIED CHRONICITY, UNSPECIFIED WHETHER SCIATICA PRESENT: ICD-10-CM

## 2021-06-08 PROCEDURE — 72100 X-RAY EXAM L-S SPINE 2/3 VWS: CPT

## 2021-06-08 PROCEDURE — 72220 X-RAY EXAM SACRUM TAILBONE: CPT

## 2021-06-10 ENCOUNTER — TELEPHONE (OUTPATIENT)
Dept: INTERNAL MEDICINE CLINIC | Age: 56
End: 2021-06-10

## 2021-06-10 RX ORDER — PREDNISONE 10 MG/1
TABLET ORAL
Qty: 15 TABLET | Refills: 0 | Status: SHIPPED | OUTPATIENT
Start: 2021-06-10 | End: 2022-03-01 | Stop reason: ALTCHOICE

## 2021-06-10 NOTE — TELEPHONE ENCOUNTER
Pt wants Dr. Javier Morales to call her w/ explanation of x-ray results. States it is a lot to take in w/o further advise or information.   Please advise

## 2021-06-10 NOTE — TELEPHONE ENCOUNTER
Spoke to pt gave results and advise. Sitting is extremely uncomfortable. She has a couple of those doughnuts she sits on but still bothers her badly.      Cell number: [phone]

## 2021-06-10 NOTE — TELEPHONE ENCOUNTER
The plan would be to take a short course of prednisone is a strong anti-inflammatory and see if this relieves the pain. If it does not or if the pain returns the next step is an MRI of the lumbar spine and coccyx. I would suggest she try the prednisone first since it is cheap and easy to take.     Prednisone 10 mg   #15      2 daily x5 days, then 1 daily till gone

## 2021-06-16 ENCOUNTER — TELEPHONE (OUTPATIENT)
Dept: INTERNAL MEDICINE CLINIC | Age: 56
End: 2021-06-16

## 2021-06-16 NOTE — TELEPHONE ENCOUNTER
She asked if the Prednisone and make you gain weight     The back pain and pain in tail bone and is the Prednisone going to clear up the pain     Should she be going to a back specialist  To see if it requires more attention?     Please advise

## 2021-09-02 ENCOUNTER — TELEPHONE (OUTPATIENT)
Dept: INTERNAL MEDICINE CLINIC | Age: 56
End: 2021-09-02

## 2021-09-02 DIAGNOSIS — J32.9 SINUSITIS, UNSPECIFIED CHRONICITY, UNSPECIFIED LOCATION: Primary | ICD-10-CM

## 2021-09-02 RX ORDER — AZITHROMYCIN 250 MG/1
250 TABLET, FILM COATED ORAL SEE ADMIN INSTRUCTIONS
Qty: 6 TABLET | Refills: 0 | Status: SHIPPED | OUTPATIENT
Start: 2021-09-02 | End: 2021-09-07

## 2021-09-02 NOTE — TELEPHONE ENCOUNTER
Patient called in stating that has not been feeling well. Symptoms include on and off fever, cough, fatigue, stuffy nose, sore throat, headache and coughing up green mucus. Symptoms started Monday. Patient went and got tested for Covid yesterday and results were negative. Patient has taken Tylenol and Nyquil last night. [employer] [address] [place] [address],[address] [facility] [address] [phone] - F [phone]        Pls call and advise.

## 2021-09-07 RX ORDER — CITALOPRAM 20 MG/1
TABLET ORAL
Qty: 30 TABLET | Refills: 4 | Status: SHIPPED | OUTPATIENT
Start: 2021-09-07 | End: 2022-02-16

## 2021-12-02 ENCOUNTER — TELEPHONE (OUTPATIENT)
Dept: INTERNAL MEDICINE CLINIC | Age: 56
End: 2021-12-02

## 2021-12-02 RX ORDER — AMOXICILLIN AND CLAVULANATE POTASSIUM 875; 125 MG/1; MG/1
1 TABLET, FILM COATED ORAL 2 TIMES DAILY
Qty: 20 TABLET | Refills: 0 | Status: SHIPPED | OUTPATIENT
Start: 2021-12-02 | End: 2021-12-12

## 2021-12-02 NOTE — TELEPHONE ENCOUNTER
----- Message from Rafita Spicer sent at 12/2/2021 10:23 AM EST -----  Subject: Message to Provider    QUESTIONS  Information for Provider? Patient stated that she was seen at [facility]   weeks for sinus infection and was given antibiotic ( not a zpac) Would   like something else sent to [facility] [address], [place] [address] [phone] - F [phone] still not working  ---------------------------------------------------------------------------  --------------  [address]  What is the best way for the office to contact you? OK to leave message on   voicemail  Preferred Call Back Phone Number? [phone]  ---------------------------------------------------------------------------  --------------  SCRIPT ANSWERS  Relationship to Patient?  Self

## 2022-02-16 RX ORDER — CITALOPRAM 20 MG/1
TABLET ORAL
Qty: 30 TABLET | Refills: 4 | Status: SHIPPED | OUTPATIENT
Start: 2022-02-16 | End: 2022-03-01 | Stop reason: SDUPTHER

## 2022-03-01 ENCOUNTER — OFFICE VISIT (OUTPATIENT)
Dept: INTERNAL MEDICINE CLINIC | Age: 57
End: 2022-03-01
Payer: COMMERCIAL

## 2022-03-01 VITALS
DIASTOLIC BLOOD PRESSURE: 62 MMHG | BODY MASS INDEX: 25.99 KG/M2 | TEMPERATURE: 98.6 F | HEIGHT: 65 IN | WEIGHT: 156 LBS | SYSTOLIC BLOOD PRESSURE: 110 MMHG

## 2022-03-01 DIAGNOSIS — M51.9 LUMBAR DISC DISEASE: ICD-10-CM

## 2022-03-01 DIAGNOSIS — F43.9 STRESS: Primary | ICD-10-CM

## 2022-03-01 DIAGNOSIS — Z12.11 COLON CANCER SCREENING: ICD-10-CM

## 2022-03-01 PROCEDURE — 99214 OFFICE O/P EST MOD 30 MIN: CPT | Performed by: HOSPITALIST

## 2022-03-01 RX ORDER — CITALOPRAM 20 MG/1
TABLET ORAL
Qty: 90 TABLET | Refills: 1 | Status: SHIPPED | OUTPATIENT
Start: 2022-03-01 | End: 2022-10-18 | Stop reason: SDUPTHER

## 2022-03-01 ASSESSMENT — ENCOUNTER SYMPTOMS
VOICE CHANGE: 0
COUGH: 0
CONSTIPATION: 0
ABDOMINAL PAIN: 0
BACK PAIN: 1
NAUSEA: 0
WHEEZING: 0
BLOOD IN STOOL: 0
DIARRHEA: 0
SORE THROAT: 0
TROUBLE SWALLOWING: 0
ABDOMINAL DISTENTION: 0
SHORTNESS OF BREATH: 0
SINUS PRESSURE: 0
VOMITING: 0
CHEST TIGHTNESS: 0
SINUS PAIN: 0

## 2022-03-01 NOTE — PROGRESS NOTES
[facility]  Establish care visit   3/1/2022    Taty Johnson (:  1965) is a 49 y.o. female, here to establish care. Chief Complaint   Patient presents with    Establish Care    Medication Refill        Patient Active Problem List   Diagnosis    Stress    Family history of coronary artery disease    Asthma    Lumbar disc disease    Hemorrhoids    Obesity (BMI 30-39. 9)    Coccydynia       HPI  -The patient is doing well.      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 Occassional wine. 5 pack year smoking hx. Quit in . Does lots of walking and cardio.      -Chronic low back pain. Coccyx pain. ROS  Review of Systems   Constitutional: Positive for diaphoresis (Ocassional hot flashes, mainly at night.  ). Negative for activity change, appetite change, chills, fatigue, fever and unexpected weight change. HENT: Negative for sinus pressure, sinus pain, sore throat, trouble swallowing and voice change. Eyes: Positive for visual disturbance (Wears bifocals. ). Respiratory: Negative for cough, chest tightness, shortness of breath and wheezing. Cardiovascular: Negative for chest pain, palpitations and leg swelling. Gastrointestinal: Negative for abdominal distention, abdominal pain, blood in stool, constipation, diarrhea, nausea and vomiting. Endocrine: Negative for polydipsia and polyphagia. Genitourinary: Negative for decreased urine volume, difficulty urinating, dysuria and urgency. Musculoskeletal: Positive for arthralgias (Left knee and right ankle fx with pins in place. ) and back pain (Disc disease. L sided pain. ). Negative for gait problem, joint swelling and myalgias. Neurological: Negative for dizziness, seizures, syncope, light-headedness and headaches. Psychiatric/Behavioral: Negative for agitation, behavioral problems, confusion and suicidal ideas.         HISTORIES  Current Outpatient Medications on File Prior to Visit   Medication Sig Dispense Refill    Melatonin 3 MG TABS Take 3 mg by mouth daily. No current facility-administered medications on file prior to visit. No Known Allergies    Past Medical History:   Diagnosis Date    Ankle fracture     right - surgery    Ectopic pregnancy        Patient Active Problem List   Diagnosis    Stress    Family history of coronary artery disease    Asthma    Lumbar disc disease    Hemorrhoids    Obesity (BMI 30-39. 9)    Coccydynia       Past Surgical History:   Procedure Laterality Date    ANKLE FRACTURE SURGERY Right      SECTION       &     TONSILLECTOMY AND ADENOIDECTOMY         Social History     Socioeconomic History    Marital status:      Spouse name: Not on file    Number of children: Not on file    Years of education: Not on file    Highest education level: Not on file   Occupational History    Not on file   Tobacco Use    Smoking status: Never Smoker    Smokeless tobacco: Never Used   Substance and Sexual Activity    Alcohol use: Yes     Comment: social    Drug use: No    Sexual activity: Not on file   Other Topics Concern    Not on file   Social History Narrative    Not on file     Social Determinants of Health     Financial Resource Strain: Low Risk     Difficulty of Paying Living Expenses: Not hard at all   Food Insecurity: No Food Insecurity    Worried About Running Out of Food in the Last Year: Never true    Sharmaine of Food in the Last Year: Never true   Transportation Needs: No Transportation Needs    Lack of Transportation (Medical): No    Lack of Transportation (Non-Medical):  No   Physical Activity:     Days of Exercise per Week: Not on file    Minutes of Exercise per Session: Not on file   Stress:     Feeling of Stress : Not on file   Social Connections:     Frequency of Communication with Friends and Family: Not on file    Frequency of Social Gatherings with Friends and Family: Not on file    Attends Worship Services: Not on file    Active Member of Clubs or Organizations: Not on file    Attends Club or Organization Meetings: Not on file    Marital Status: Not on file   Intimate Partner Violence:     Fear of Current or Ex-Partner: Not on file    Emotionally Abused: Not on file    Physically Abused: Not on file    Sexually Abused: Not on file   Housing Stability:     Unable to Pay for Housing in the Last Year: Not on file    Number of [place] in the Last Year: Not on file    Unstable Housing in the Last Year: Not on file        Family History   Problem Relation Age of Onset    Emphysema Father     Heart Disease Mother        PE  Vitals:    03/01/22 1346   BP: 110/62   Site: Right Upper Arm   Position: Sitting   Cuff Size: Medium Adult   Temp: 98.6 °F (37 °C)   TempSrc: Temporal   Weight: 156 lb (70.8 kg)   Height: 5' 5\" (1.651 m)     Estimated body mass index is 25.96 kg/m² as calculated from the following:    Height as of this encounter: 5' 5\" (1.651 m). Weight as of this encounter: 156 lb (70.8 kg). Physical Exam  Vitals reviewed. Constitutional:       General: She is not in acute distress. Appearance: Normal appearance. HENT:      Head: Normocephalic and atraumatic. Mouth/Throat:      Pharynx: Oropharynx is clear. Eyes:      Conjunctiva/sclera: Conjunctivae normal.      Pupils: Pupils are equal, round, and reactive to light. Cardiovascular:      Rate and Rhythm: Normal rate and regular rhythm. Pulses: Normal pulses. Heart sounds: Normal heart sounds. Pulmonary:      Effort: Pulmonary effort is normal. No respiratory distress. Breath sounds: Normal breath sounds. No wheezing or rales. Abdominal:      Palpations: Abdomen is soft. Tenderness: There is no abdominal tenderness. There is no rebound. Musculoskeletal:         General: No signs of injury. Normal range of motion.       Cervical (CELEXA) 20 MG tablet     Sig: TAKE ONE TABLET BY MOUTH DAILY     Dispense:  90 tablet     Refill:  1      Medications Discontinued During This Encounter   Medication Reason    predniSONE (DELTASONE) 10 MG tablet Therapy completed    augmented betamethasone dipropionate (DIPROLENE AF) 0.05 % cream Therapy completed    citalopram (CELEXA) 20 MG tablet REORDER        Return in about 3 months (around 6/1/2022) for Chronic low back pain. Kandice Nelson MD    This dictation was generated by voice recognition computer software. Although all attempts are made to edit the dictation for accuracy, there may be errors in the transcription that are not intended.

## 2022-10-15 DIAGNOSIS — F43.9 STRESS: ICD-10-CM

## 2022-10-17 RX ORDER — CITALOPRAM 20 MG/1
TABLET ORAL
Qty: 90 TABLET | Refills: 1 | OUTPATIENT
Start: 2022-10-17

## 2022-10-18 ENCOUNTER — OFFICE VISIT (OUTPATIENT)
Dept: INTERNAL MEDICINE CLINIC | Age: 57
End: 2022-10-18
Payer: COMMERCIAL

## 2022-10-18 VITALS
HEART RATE: 102 BPM | BODY MASS INDEX: 25.96 KG/M2 | HEIGHT: 65 IN | DIASTOLIC BLOOD PRESSURE: 84 MMHG | SYSTOLIC BLOOD PRESSURE: 128 MMHG | OXYGEN SATURATION: 98 % | TEMPERATURE: 98.4 F

## 2022-10-18 DIAGNOSIS — M51.9 LUMBAR DISC DISEASE: ICD-10-CM

## 2022-10-18 DIAGNOSIS — Z23 NEED FOR INFLUENZA VACCINATION: ICD-10-CM

## 2022-10-18 DIAGNOSIS — F43.9 STRESS: Primary | ICD-10-CM

## 2022-10-18 DIAGNOSIS — Z12.31 ENCOUNTER FOR SCREENING MAMMOGRAM FOR MALIGNANT NEOPLASM OF BREAST: ICD-10-CM

## 2022-10-18 PROCEDURE — 90471 IMMUNIZATION ADMIN: CPT | Performed by: HOSPITALIST

## 2022-10-18 PROCEDURE — 99214 OFFICE O/P EST MOD 30 MIN: CPT | Performed by: HOSPITALIST

## 2022-10-18 PROCEDURE — 90694 VACC AIIV4 NO PRSRV 0.5ML IM: CPT | Performed by: HOSPITALIST

## 2022-10-18 RX ORDER — CITALOPRAM 20 MG/1
TABLET ORAL
Qty: 90 TABLET | Refills: 1 | Status: SHIPPED | OUTPATIENT
Start: 2022-10-18 | End: 2023-10-18

## 2022-10-18 SDOH — ECONOMIC STABILITY: FOOD INSECURITY: WITHIN THE PAST 12 MONTHS, THE FOOD YOU BOUGHT JUST DIDN'T LAST AND YOU DIDN'T HAVE MONEY TO GET MORE.: NEVER TRUE

## 2022-10-18 SDOH — ECONOMIC STABILITY: FOOD INSECURITY: WITHIN THE PAST 12 MONTHS, YOU WORRIED THAT YOUR FOOD WOULD RUN OUT BEFORE YOU GOT MONEY TO BUY MORE.: NEVER TRUE

## 2022-10-18 ASSESSMENT — PATIENT HEALTH QUESTIONNAIRE - PHQ9
SUM OF ALL RESPONSES TO PHQ QUESTIONS 1-9: 0
SUM OF ALL RESPONSES TO PHQ QUESTIONS 1-9: 0
1. LITTLE INTEREST OR PLEASURE IN DOING THINGS: 0
2. FEELING DOWN, DEPRESSED OR HOPELESS: 0
SUM OF ALL RESPONSES TO PHQ QUESTIONS 1-9: 0
SUM OF ALL RESPONSES TO PHQ QUESTIONS 1-9: 0
SUM OF ALL RESPONSES TO PHQ9 QUESTIONS 1 & 2: 0

## 2022-10-18 ASSESSMENT — SOCIAL DETERMINANTS OF HEALTH (SDOH): HOW HARD IS IT FOR YOU TO PAY FOR THE VERY BASICS LIKE FOOD, HOUSING, MEDICAL CARE, AND HEATING?: NOT HARD AT ALL

## 2022-10-18 NOTE — PROGRESS NOTES
[facility]  Follow-up visit   10/18/2022    Luis Guardado (:  1965) is a 49 y.o. female, here for follow-up:    Chief Complaint   Patient presents with    Medication Refill        HPI  HPI  -The patient is doing well.       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 Done this for 34 years. Occassional wine. 5 pack year smoking hx. Quit in . Does lots of walking and cardio.       -Chronic low back pain. Coccyx pain. Hasn't done MRI LS Spine yet. Order still valid. -Reviewed colonoscopy 7/15/2022.  3 mm polyp mid rectum. Repeat 5 years. 2027.      -Stress. Mood is good. ROS  Constitutional: Positive for diaphoresis (Ocassional hot flashes, mainly at night.  ). Negative for activity change, appetite change, chills, fatigue, fever and unexpected weight change. HENT: Negative for sinus pressure, sinus pain, sore throat, trouble swallowing and voice change. Eyes: Positive for visual disturbance (Wears bifocals. ). Respiratory: Negative for cough, chest tightness, shortness of breath and wheezing. Cardiovascular: Negative for chest pain, palpitations and leg swelling. Gastrointestinal: Negative for abdominal distention, abdominal pain, blood in stool, constipation, diarrhea, nausea and vomiting. Endocrine: Negative for polydipsia and polyphagia. Genitourinary: Negative for decreased urine volume, difficulty urinating, dysuria and urgency. Musculoskeletal: Positive for arthralgias (Left knee and right ankle fx with pins in place. ) and back pain (Disc disease. L sided pain. ). Negative for gait problem, joint swelling and myalgias. Neurological: Negative for dizziness, seizures, syncope, light-headedness and headaches. Psychiatric/Behavioral: Negative for agitation, behavioral problems, confusion and suicidal ideas.      HISTORIES  Current Outpatient Medications on File Prior to Visit   Medication Sig Dispense Refill    Melatonin 3 MG TABS Take 3 mg by mouth daily. No current facility-administered medications on file prior to visit. No Known Allergies  Past Medical History:   Diagnosis Date    Ankle fracture     right - surgery    Ectopic pregnancy      Patient Active Problem List   Diagnosis    Stress    Family history of coronary artery disease    Asthma    Lumbar disc disease    Hemorrhoids    Obesity (BMI 30-39. 9)    Coccydynia     Past Surgical History:   Procedure Laterality Date    ANKLE FRACTURE SURGERY Right      SECTION       &     TONSILLECTOMY AND ADENOIDECTOMY       Social History     Socioeconomic History    Marital status:      Spouse name: Not on file    Number of children: Not on file    Years of education: Not on file    Highest education level: Not on file   Occupational History    Not on file   Tobacco Use    Smoking status: Never    Smokeless tobacco: Never   Substance and Sexual Activity    Alcohol use: Yes     Comment: social    Drug use: No    Sexual activity: Not on file   Other Topics Concern    Not on file   Social History Narrative    Not on file     Social Determinants of Health     Financial Resource Strain: Low Risk     Difficulty of Paying Living Expenses: Not hard at all   Food Insecurity: No Food Insecurity    Worried About Running Out of Food in the Last Year: Never true    Ran Out of Food in the Last Year: Never true   Transportation Needs: Not on file   Physical Activity: Not on file   Stress: Not on file   Social Connections: Not on file   Intimate Partner Violence: Not on file   Housing Stability: Not on file      Family History   Problem Relation Age of Onset    Emphysema Father     Heart Disease Mother        PE  Vitals:    10/18/22 1414   BP: 128/84   Site: Right Upper Arm   Position: Sitting   Pulse: (!) 102   Temp: 98.4 °F (36.9 °C)   SpO2: 98%   Height: 5' 5\" (1.651 m)     Estimated body mass index is 25.96 kg/m² as calculated from the following:    Height as of this encounter: 5' 5\" (1.651 m). Weight as of 3/1/22: 156 lb (70.8 kg). Physical Exam  Vitals reviewed. Constitutional:       General: She is not in acute distress. Appearance: Normal appearance. HENT:      Head: Normocephalic and atraumatic. Mouth/Throat:      Pharynx: Oropharynx is clear. Eyes:      Conjunctiva/sclera: Conjunctivae normal.      Pupils: Pupils are equal, round, and reactive to light. Cardiovascular:      Rate and Rhythm: Normal rate and regular rhythm. Pulses: Normal pulses. Heart sounds: Normal heart sounds. Pulmonary:      Effort: Pulmonary effort is normal. No respiratory distress. Breath sounds: Normal breath sounds. No wheezing or rales. Abdominal:      Palpations: Abdomen is soft. Tenderness: There is no abdominal tenderness. There is no rebound. Musculoskeletal:         General: No signs of injury. Normal range of motion. Cervical back: Normal range of motion and neck supple. Skin:     General: Skin is warm and dry. Coloration: Skin is not jaundiced. Neurological:      General: No focal deficit present. Mental Status: She is alert and oriented to person, place, and time. Psychiatric:         Behavior: Behavior normal.         Thought Content: Thought content normal.         Judgment: Judgment normal.       ASSESSMENT/ PLAN:  1. Stress  - Doing well. - citalopram (CELEXA) 20 MG tablet; TAKE ONE TABLET BY MOUTH DAILY  Dispense: 90 tablet; Refill: 1    2. Lumbar disc disease  - Patient to do MRI.       Orders Placed This Encounter   Procedures    SIL DIGITAL SCREEN W OR WO CAD BILATERAL        Orders Placed This Encounter   Medications    citalopram (CELEXA) 20 MG tablet     Sig: TAKE ONE TABLET BY MOUTH DAILY     Dispense:  90 tablet     Refill:  1      Medications Discontinued During This Encounter   Medication Reason    citalopram (CELEXA) 20 MG tablet REORDER        Return in about 6 months (around 4/18/2023). Clarissa Jose MD    This dictation was generated by voice recognition computer software. Although all attempts are made to edit the dictation for accuracy, there may be errors in the transcription that are not intended.

## 2022-10-18 NOTE — TELEPHONE ENCOUNTER
Patient is following up on this med request:    citalopram (CELEXA) 20 MG tablet    She has been out of this medication for two days now. Going out of town on Thursday, needs this asap.     [facility] [id number] [facility], [address] [place] [address],[address] [place] [address] [phone] - [phone]     Please call and advise

## 2023-01-04 NOTE — TELEPHONE ENCOUNTER
Patient is a 89y old  Male who presents with a chief complaint of respiratory failure,  Debility (03 Jan 2023 18:40)    HPI:  89 year old male with a PMH of CVA, CHF, HTN, afib on eliquis, hip fx s/p fall and prior intubation for HF exacerbation presenting to [facility] ED for SOB and CP admitted to ICU at [facility] on 12/17/22 for acute hypoxic respiratory failure, flash pulmonary edema and acute CHF exacerbation. Also found to be Flu A positive, resulting in multifocal PNA. Pt was on BIPAP, weaned down to NC 4L. Tolerated well. Troponins were mildly elevatd, but stablized, likely demand ischemia. Pt initially treated w/ tamiflu for influenza A, later developed multifocal pneunonia, neg for MRSA and legionella. ID recommended Cefepime, which patient finished last dose of on 1/3/23. Patient downgraded to medicine on 12/21/22. Seen by PM&R, PT, OT who recommended acute rehab. Patient admitted to [facility] for acute rehab on 1/3/23.     (03 Jan 2023 13:38)    PAST MEDICAL & SURGICAL HISTORY:  Afib      Congestive heart failure (CHF)      CVA (cerebral vascular accident)      Hypertension, unspecified type      No significant past surgical history        SOCIAL HISTORY:  FAMILY HISTORY:    ALLERGIES:  No Known Allergies    MEDICATIONS  (STANDING):  allopurinol 100 milliGRAM(s) Oral daily  apixaban 2.5 milliGRAM(s) Oral every 12 hours  aspirin enteric coated 81 milliGRAM(s) Oral daily  benzonatate 200 milliGRAM(s) Oral three times a day  budesonide 160 MICROgram(s)/formoterol 4.5 MICROgram(s) Inhaler 2 Puff(s) Inhalation two times a day  fluticasone propionate 50 MICROgram(s)/spray Nasal Spray 1 Spray(s) Both Nostrils two times a day  furosemide    Tablet 40 milliGRAM(s) Oral two times a day  guaiFENesin  milliGRAM(s) Oral every 12 hours  hydrALAZINE 25 milliGRAM(s) Oral every 8 hours  metoprolol succinate ER 50 milliGRAM(s) Oral daily  senna 2 Tablet(s) Oral at bedtime  tiotropium 2.5 MICROgram(s) Inhaler 2 Puff(s) Inhalation daily    MEDICATIONS  (PRN):  acetaminophen     Tablet .. 650 milliGRAM(s) Oral every 6 hours PRN Temp greater or equal to 38C (100.4F), Mild Pain (1 - 3), Moderate Pain (4 - 6)  albuterol    90 MICROgram(s) HFA Inhaler 2 Puff(s) Inhalation every 6 hours PRN Shortness of Breath  bisacodyl 5 milliGRAM(s) Oral every 12 hours PRN Constipation  hemorrhoidal Ointment 1 Application(s) Rectal two times a day PRN rectal itching/ pain  sodium chloride 0.65% Nasal 1 Spray(s) Both Nostrils every 6 hours PRN Nasal Congestion    Review of Systems: Refer to HPI for pertinent positives and negatives. All other ROS reviewed and negative except as otherwise stated above.    Vital Signs Last 24 Hrs  T(F): 98.1 (03 Jan 2023 21:54), Max: 98.6 (03 Jan 2023 19:38)  HR: 68 (04 Jan 2023 05:51) (60 - 69)  BP: 150/69 (04 Jan 2023 05:51) (133/67 - 150/69)  RR: 16 (03 Jan 2023 21:54) (16 - 17)  SpO2: 96% (03 Jan 2023 21:54) (95% - 98%)  I&O's Summary    PHYSICAL EXAM:  GENERAL: NAD, well-groomed, well-developed  HEAD:  Atraumatic, Normocephalic  EYES: EOMI, PERRL, conjunctiva and sclera clear  ENMT: Moist mucous membranes, Good dentition  NECK: Supple, No JVD  CHEST/LUNG: Clear to auscultation bilaterally, non-labored breathing, good air entry  HEART: RRR; S1/S2, No murmur  ABDOMEN: Soft, Nontender, Nondistended; Bowel sounds present  VASCULAR: Normal pulses, Normal capillary refill  EXTREMITIES: No cyanosis, No edema  LYMPH: No lymphadenopathy noted  SKIN: Warm, Intact  PSYCH: Normal mood and affect  NERVOUS SYSTEM:  A/O x3, Good concentration; CN 2-12 intact, No focal deficits, moves all extremities    LABS:                        10.7   12.65 )-----------( 234      ( 03 Jan 2023 06:00 )             32.4     01-03    138  |  102  |  47  ----------------------------<  92  4.0   |  29  |  1.19    Ca    8.7      03 Jan 2023 06:00    TPro  6.8  /  Alb  2.3  /  TBili  0.5  /  DBili  x   /  AST  36  /  ALT  33  /  AlkPhos  49  01-02        COVID-19 PCR: NotDetec (01-02-23 @ 10:55)  COVID-19 PCR: NotDetec (01-02-23 @ 06:00)    RADIOLOGY & ADDITIONAL TESTS:  < from: CT Chest No Cont (12.27.22 @ 08:57) >    IMPRESSION:    Consolidations within bilateral lower lobes and patchy/nodular opacities   within all lobes likely representing multifocal pneumonia. Small left   pleural effusion.    < end of copied text >    < from: TTE Echo Complete w/o Contrast w/ Doppler (12.17.22 @ 12:52) >    Summary:   1. Biatrial enlargement   2. Left ventricular ejection fraction, by visual estimation, is 45 to   50%.   3. Mildly decreased global left ventricular systolic function.   4. There is mild concentric left ventricular hypertrophy.   5. Moderate mitral valve regurgitation.   6. Moderate thickening and calcification of the anterior and posterior   mitral valve leaflets.   7. Mild-moderate tricuspid regurgitation.   8. Mild pulmonic valve regurgitation.   9. Apical septal segment, apex, and basal and mid inferior septum are   abnormal as described above.    < end of copied text >    Care Discussed with Consultants/Other Providers: rehab team during IDR Please refill      Patient is calling in because she forgot Dr. Castillo Section retired. She said it slipped her mind because she usually gets 6 months supply of it. She onlu remembered because she called pharmacy and she was out of refills. She said she cant be without this med and is desperate.  She said is there anything that can be done until she comes to her appt with Dr. Jesse Rivers        citalopram [facility] [place] BECKI COLLINS) 20 MG tablet           [address], [address] [place] [id number]   Phone:  [phone]  Fax:  [phone] Patient is a 89y old  Male who presents with a chief complaint of respiratory failure,  Debility (03 Jan 2023 18:40)    HPI:  89 year old male with a PMH of CVA, CHF, HTN, afib on eliquis, hip fx s/p fall and prior intubation for HF exacerbation presenting to [facility] ED for SOB and CP admitted to ICU at [facility] on 12/17/22 for acute hypoxic respiratory failure, flash pulmonary edema and acute CHF exacerbation. Also found to be Flu A positive, resulting in multifocal PNA. Pt was on BIPAP, weaned down to NC 4L. Tolerated well. Troponins were mildly elevatd, but stablized, likely demand ischemia. Pt initially treated w/ tamiflu for influenza A, later developed multifocal pneunonia, neg for MRSA and legionella. ID recommended Cefepime, which patient finished last dose of on 1/3/23. Patient downgraded to medicine on 12/21/22. Seen by PM&R, PT, OT who recommended acute rehab. Patient admitted to [facility] for acute rehab on 1/3/23.     (03 Jan 2023 13:38)    Patient reports feeling lightheaded and dizzy at times. Says he is improving overall. Breathing still not back to normal yet and occasionally gets SOB.  denies fevers and chills.  He admits to constipation.    PAST MEDICAL & SURGICAL HISTORY:  Afib      Congestive heart failure (CHF)      CVA (cerebral vascular accident)      Hypertension, unspecified type      No significant past surgical history        SOCIAL HISTORY: Pt drinks 1-2 glasses of wine a day. He smokes cigars.  FAMILY HISTORY: father w/ ESRD    ALLERGIES:  No Known Allergies    MEDICATIONS  (STANDING):  allopurinol 100 milliGRAM(s) Oral daily  apixaban 2.5 milliGRAM(s) Oral every 12 hours  aspirin enteric coated 81 milliGRAM(s) Oral daily  benzonatate 200 milliGRAM(s) Oral three times a day  budesonide 160 MICROgram(s)/formoterol 4.5 MICROgram(s) Inhaler 2 Puff(s) Inhalation two times a day  fluticasone propionate 50 MICROgram(s)/spray Nasal Spray 1 Spray(s) Both Nostrils two times a day  furosemide    Tablet 40 milliGRAM(s) Oral two times a day  guaiFENesin  milliGRAM(s) Oral every 12 hours  hydrALAZINE 25 milliGRAM(s) Oral every 8 hours  metoprolol succinate ER 50 milliGRAM(s) Oral daily  senna 2 Tablet(s) Oral at bedtime  tiotropium 2.5 MICROgram(s) Inhaler 2 Puff(s) Inhalation daily    MEDICATIONS  (PRN):  acetaminophen     Tablet .. 650 milliGRAM(s) Oral every 6 hours PRN Temp greater or equal to 38C (100.4F), Mild Pain (1 - 3), Moderate Pain (4 - 6)  albuterol    90 MICROgram(s) HFA Inhaler 2 Puff(s) Inhalation every 6 hours PRN Shortness of Breath  bisacodyl 5 milliGRAM(s) Oral every 12 hours PRN Constipation  hemorrhoidal Ointment 1 Application(s) Rectal two times a day PRN rectal itching/ pain  sodium chloride 0.65% Nasal 1 Spray(s) Both Nostrils every 6 hours PRN Nasal Congestion    Review of Systems: Refer to HPI for pertinent positives and negatives. All other ROS reviewed and negative except as otherwise stated above.    Vital Signs Last 24 Hrs  T(F): 98.1 (03 Jan 2023 21:54), Max: 98.6 (03 Jan 2023 19:38)  HR: 68 (04 Jan 2023 05:51) (60 - 69)  BP: 150/69 (04 Jan 2023 05:51) (133/67 - 150/69)  RR: 16 (03 Jan 2023 21:54) (16 - 17)  SpO2: 96% (03 Jan 2023 21:54) (95% - 98%)  I&O's Summary    PHYSICAL EXAM:  GENERAL: NAD, well-groomed, well-developed, eldelry  HEAD:  Atraumatic, Normocephalic  EYES: EOMI, PERRL, conjunctiva and sclera clear  ENMT: Moist mucous membranes, poor dentition  NECK: Supple, No JVD  CHEST/LUNG: Clear to auscultation bilaterally, dec BS at the right lung base, non-labored breathing, good air entry, on room air.  HEART: RRR; S1/S2, No murmur  ABDOMEN: Soft, Nontender, Nondistended; Bowel sounds present  VASCULAR: Normal pulses, Normal capillary refill  EXTREMITIES: No cyanosis, trace edema, wrinkling of LEs.  LYMPH: No lymphadenopathy noted  SKIN: Warm, Intact  PSYCH: Normal mood and affect  NERVOUS SYSTEM:  A/O x3, Good concentration; CN 2-12 intact, No focal deficits, moves all extremities    LABS:                        10.7   12.65 )-----------( 234      ( 03 Jan 2023 06:00 )             32.4     01-03    138  |  102  |  47  ----------------------------<  92  4.0   |  29  |  1.19    Ca    8.7      03 Jan 2023 06:00    TPro  6.8  /  Alb  2.3  /  TBili  0.5  /  DBili  x   /  AST  36  /  ALT  33  /  AlkPhos  49  01-02        COVID-19 PCR: NotDetec (01-02-23 @ 10:55)  COVID-19 PCR: NotDetec (01-02-23 @ 06:00)    RADIOLOGY & ADDITIONAL TESTS:  < from: CT Chest No Cont (12.27.22 @ 08:57) >    IMPRESSION:    Consolidations within bilateral lower lobes and patchy/nodular opacities   within all lobes likely representing multifocal pneumonia. Small left   pleural effusion.    < end of copied text >    < from: TTE Echo Complete w/o Contrast w/ Doppler (12.17.22 @ 12:52) >    Summary:   1. Biatrial enlargement   2. Left ventricular ejection fraction, by visual estimation, is 45 to   50%.   3. Mildly decreased global left ventricular systolic function.   4. There is mild concentric left ventricular hypertrophy.   5. Moderate mitral valve regurgitation.   6. Moderate thickening and calcification of the anterior and posterior   mitral valve leaflets.   7. Mild-moderate tricuspid regurgitation.   8. Mild pulmonic valve regurgitation.   9. Apical septal segment, apex, and basal and mid inferior septum are   abnormal as described above.    < end of copied text >    1/1/23 CXR    IMPRESSION:  Multifocal pneumonia and/or pulmonary edema with a left pleural effusion      Care Discussed with Consultants/Other Providers: rehab team during IDR

## 2023-01-25 DIAGNOSIS — F43.9 STRESS: ICD-10-CM

## 2023-01-25 RX ORDER — CITALOPRAM 20 MG/1
TABLET ORAL
Qty: 90 TABLET | Refills: 1 | Status: SHIPPED | OUTPATIENT
Start: 2023-01-25 | End: 2024-01-25

## 2023-01-25 NOTE — TELEPHONE ENCOUNTER
Refill-     citalopram (CELEXA) 20 MG tablet      [address] [phone] Zully [address]      Please advise

## 2023-04-21 ENCOUNTER — TELEPHONE (OUTPATIENT)
Dept: INTERNAL MEDICINE CLINIC | Age: 58
End: 2023-04-21

## 2023-05-14 NOTE — TELEPHONE ENCOUNTER
1 Patient called and requested the following medication to be refilled:        citalopram (CELEXA) 20 MG tablet        [facility] [address] [place] [address] [address] [place] [address]   Phone:  [phone]  Fax:  [phone]      Call for questions

## 2023-07-18 DIAGNOSIS — F43.9 STRESS: ICD-10-CM

## 2023-07-18 RX ORDER — CITALOPRAM 20 MG/1
TABLET ORAL
Qty: 90 TABLET | Refills: 1 | Status: SHIPPED | OUTPATIENT
Start: 2023-07-18

## 2023-12-21 ENCOUNTER — TELEPHONE (OUTPATIENT)
Dept: INTERNAL MEDICINE CLINIC | Age: 58
End: 2023-12-21

## 2023-12-21 NOTE — TELEPHONE ENCOUNTER
Pt  called stated patient is having back pain that has been going on for awhile but now is just getting progressively worse she does work in retail. He is wanting to know if pt should come in here or be referred to ortho. If ortho who does MD recommend.

## 2023-12-27 ASSESSMENT — PATIENT HEALTH QUESTIONNAIRE - PHQ9
SUM OF ALL RESPONSES TO PHQ9 QUESTIONS 1 & 2: 0
1. LITTLE INTEREST OR PLEASURE IN DOING THINGS: NOT AT ALL
2. FEELING DOWN, DEPRESSED OR HOPELESS: NOT AT ALL

## 2023-12-28 ENCOUNTER — OFFICE VISIT (OUTPATIENT)
Dept: INTERNAL MEDICINE CLINIC | Age: 58
End: 2023-12-28
Payer: COMMERCIAL

## 2023-12-28 VITALS
HEART RATE: 67 BPM | SYSTOLIC BLOOD PRESSURE: 118 MMHG | OXYGEN SATURATION: 97 % | BODY MASS INDEX: 33.18 KG/M2 | HEIGHT: 60 IN | DIASTOLIC BLOOD PRESSURE: 67 MMHG | WEIGHT: 169 LBS | TEMPERATURE: 97.3 F

## 2023-12-28 DIAGNOSIS — Z12.31 ENCOUNTER FOR SCREENING MAMMOGRAM FOR MALIGNANT NEOPLASM OF BREAST: ICD-10-CM

## 2023-12-28 DIAGNOSIS — F43.9 STRESS: ICD-10-CM

## 2023-12-28 DIAGNOSIS — Z00.00 PREVENTATIVE HEALTH CARE: ICD-10-CM

## 2023-12-28 DIAGNOSIS — M51.9 LUMBAR DISC DISEASE: Primary | ICD-10-CM

## 2023-12-28 DIAGNOSIS — Z23 NEED FOR INFLUENZA VACCINATION: ICD-10-CM

## 2023-12-28 PROCEDURE — 99214 OFFICE O/P EST MOD 30 MIN: CPT | Performed by: HOSPITALIST

## 2023-12-28 PROCEDURE — 90471 IMMUNIZATION ADMIN: CPT | Performed by: HOSPITALIST

## 2023-12-28 PROCEDURE — 90674 CCIIV4 VAC NO PRSV 0.5 ML IM: CPT | Performed by: HOSPITALIST

## 2023-12-28 RX ORDER — CITALOPRAM 20 MG/1
TABLET ORAL
Qty: 90 TABLET | Refills: 1 | Status: SHIPPED | OUTPATIENT
Start: 2023-12-28

## 2023-12-28 RX ORDER — PREDNISONE 20 MG/1
40 TABLET ORAL DAILY
Qty: 20 TABLET | Refills: 0 | Status: SHIPPED | OUTPATIENT
Start: 2023-12-28 | End: 2024-01-07

## 2023-12-28 NOTE — PROGRESS NOTES
MRI.  - predniSONE (DELTASONE) 20 MG tablet; Take 2 tablets by mouth daily for 10 days  Dispense: 20 tablet; Refill: 0  - MRI LUMBAR SPINE WO CONTRAST; Future    2. Stress  -I have refilled medication. The problem is well-controlled. - citalopram (CELEXA) 20 MG tablet; TAKE 1 TABLET BY MOUTH EVERY DAY  Dispense: 90 tablet; Refill: 1    3. Encounter for screening mammogram for malignant neoplasm of breast  - ISL DIGITAL SCREEN W OR WO CAD BILATERAL; Future    4. Preventative health care  - CBC; Future  - Comprehensive Metabolic Panel; Future  - Lipid, Fasting; Future  - TSH with Reflex; Future  - Hemoglobin A1C; Future       Orders Placed This Encounter   Procedures    MRI LUMBAR SPINE WO CONTRAST    SIL DIGITAL SCREEN W OR WO CAD BILATERAL    CBC    Comprehensive Metabolic Panel    Lipid, Fasting    TSH with Reflex    Hemoglobin A1C      Orders Placed This Encounter   Medications    citalopram (CELEXA) 20 MG tablet     Sig: TAKE 1 TABLET BY MOUTH EVERY DAY     Dispense:  90 tablet     Refill:  1     ZERO refills remain on this prescription. Your patient is requesting advance approval of refills for this medication to PREVENT ANY MISSED DOSES    predniSONE (DELTASONE) 20 MG tablet     Sig: Take 2 tablets by mouth daily for 10 days     Dispense:  20 tablet     Refill:  0      Medications Discontinued During This Encounter   Medication Reason    citalopram (CELEXA) 20 MG tablet REORDER        Return in about 6 months (around 6/28/2024). Collette Oz, MD    This dictation was generated by voice recognition computer software. Although all attempts are made to edit the dictation for accuracy, there may be errors in the transcription that are not intended.

## 2023-12-29 LAB
ALBUMIN SERPL-MCNC: 4.6 G/DL (ref 3.4–5)
ALBUMIN/GLOB SERPL: 1.9 {RATIO} (ref 1.1–2.2)
ALP SERPL-CCNC: 76 U/L (ref 40–129)
ALT SERPL-CCNC: 14 U/L (ref 10–40)
ANION GAP SERPL CALCULATED.3IONS-SCNC: 9 MMOL/L (ref 3–16)
AST SERPL-CCNC: 16 U/L (ref 15–37)
BILIRUB SERPL-MCNC: 0.3 MG/DL (ref 0–1)
BUN SERPL-MCNC: 20 MG/DL (ref 7–20)
CALCIUM SERPL-MCNC: 10.1 MG/DL (ref 8.3–10.6)
CHLORIDE SERPL-SCNC: 103 MMOL/L (ref 99–110)
CHOLEST SERPL-MCNC: 227 MG/DL (ref 0–199)
CO2 SERPL-SCNC: 26 MMOL/L (ref 21–32)
CREAT SERPL-MCNC: 0.6 MG/DL (ref 0.6–1.1)
EST. AVERAGE GLUCOSE BLD GHB EST-MCNC: 105.4 MG/DL
GFR SERPLBLD CREATININE-BSD FMLA CKD-EPI: >60 ML/MIN/{1.73_M2}
GLUCOSE SERPL-MCNC: 88 MG/DL (ref 70–99)
HBA1C MFR BLD: 5.3 %
HCT VFR BLD AUTO: 43.8 % (ref 36–48)
HDLC SERPL-MCNC: 77 MG/DL (ref 40–60)
HGB BLD-MCNC: 14.7 G/DL (ref 12–16)
LDL CHOLESTEROL CALCULATED: 128 MG/DL
MCH RBC QN AUTO: 30.9 PG (ref 26–34)
MCHC RBC AUTO-ENTMCNC: 33.5 G/DL (ref 31–36)
MCV RBC AUTO: 92.1 FL (ref 80–100)
PLATELET # BLD AUTO: 249 K/UL (ref 135–450)
PMV BLD AUTO: 8.4 FL (ref 5–10.5)
POTASSIUM SERPL-SCNC: 5.1 MMOL/L (ref 3.5–5.1)
PROT SERPL-MCNC: 7 G/DL (ref 6.4–8.2)
RBC # BLD AUTO: 4.76 M/UL (ref 4–5.2)
SODIUM SERPL-SCNC: 138 MMOL/L (ref 136–145)
TRIGL SERPL-MCNC: 110 MG/DL (ref 0–150)
TSH SERPL DL<=0.005 MIU/L-ACNC: 2.25 UIU/ML (ref 0.27–4.2)
VLDLC SERPL CALC-MCNC: 22 MG/DL
WBC # BLD AUTO: 5 K/UL (ref 4–11)

## 2024-01-11 ENCOUNTER — HOSPITAL ENCOUNTER (OUTPATIENT)
Dept: MAMMOGRAPHY | Age: 59
Discharge: HOME | End: 2024-01-11
Attending: HOSPITALIST
Payer: COMMERCIAL

## 2024-01-11 VITALS — HEIGHT: 60 IN | WEIGHT: 169 LBS | BODY MASS INDEX: 33.18 KG/M2

## 2024-01-11 DIAGNOSIS — Z12.31 VISIT FOR SCREENING MAMMOGRAM: ICD-10-CM

## 2024-01-11 PROCEDURE — 77063 BREAST TOMOSYNTHESIS BI: CPT

## 2024-01-12 ENCOUNTER — HOSPITAL ENCOUNTER (OUTPATIENT)
Dept: MRI IMAGING | Age: 59
Discharge: HOME | End: 2024-01-12
Attending: HOSPITALIST
Payer: COMMERCIAL

## 2024-01-12 DIAGNOSIS — M51.9 LUMBAR DISC DISEASE: ICD-10-CM

## 2024-01-12 PROCEDURE — 72148 MRI LUMBAR SPINE W/O DYE: CPT

## 2024-01-17 ENCOUNTER — TELEPHONE (OUTPATIENT)
Dept: INTERNAL MEDICINE CLINIC | Age: 59
End: 2024-01-17

## 2024-01-17 DIAGNOSIS — M54.9 ACUTE BACK PAIN, UNSPECIFIED BACK LOCATION, UNSPECIFIED BACK PAIN LATERALITY: Primary | ICD-10-CM

## 2024-01-17 NOTE — TELEPHONE ENCOUNTER
Please inform,     The patient has multiple findings on her lumbar spine which could be causing her back pain including spinal canal narrowing, arthritis, and some mild nerve root compression and bulging disks. I can refer her to a neurosurgeon if she wishes. Okay to place a referral to Dr. Lewis if she wishes.

## 2024-01-17 NOTE — TELEPHONE ENCOUNTER
----- Message from Maureen Tello sent at 1/16/2024  1:30 PM EST -----  Subject: Message to Provider    QUESTIONS  Information for Provider? pt is returning Dr Phan's call / please call   back after 415pm  ---------------------------------------------------------------------------  --------------  CALL BACK INFO  [phone]; OK to leave message on voicemail  ---------------------------------------------------------------------------  --------------  SCRIPT ANSWERS  undefined

## 2024-01-19 ENCOUNTER — TELEPHONE (OUTPATIENT)
Dept: INTERNAL MEDICINE CLINIC | Age: 59
End: 2024-01-19

## 2024-01-19 NOTE — TELEPHONE ENCOUNTER
----- Message from Vandana Ruiz sent at 1/18/2024 10:41 AM EST -----  Subject: Results Request    QUESTIONS  Results: MRI Lumbar - Pt has more questions; Ordered by:   Date Performed: 2024-01-12  ---------------------------------------------------------------------------  --------------  CALL BACK INFO    [phone]; OK to leave message on voicemail  ---------------------------------------------------------------------------  --------------

## 2024-01-29 ENCOUNTER — TELEPHONE (OUTPATIENT)
Dept: INTERNAL MEDICINE CLINIC | Age: 59
End: 2024-01-29

## 2024-01-29 NOTE — TELEPHONE ENCOUNTER
Pt would like MRI results to be sent to her back DR. Sp Dolan ([facility])  ph. [phone] or  [phone]     Pt has an appt Wednesday morning     Please call and advise

## 2024-05-03 ENCOUNTER — OFFICE VISIT (OUTPATIENT)
Dept: FAMILY MEDICINE CLINIC | Age: 59
End: 2024-05-03
Payer: COMMERCIAL

## 2024-05-03 VITALS — OXYGEN SATURATION: 96 % | HEART RATE: 89 BPM | SYSTOLIC BLOOD PRESSURE: 122 MMHG | DIASTOLIC BLOOD PRESSURE: 84 MMHG

## 2024-05-03 DIAGNOSIS — J20.9 ACUTE BRONCHITIS, UNSPECIFIED ORGANISM: Primary | ICD-10-CM

## 2024-05-03 PROCEDURE — 99213 OFFICE O/P EST LOW 20 MIN: CPT | Performed by: STUDENT IN AN ORGANIZED HEALTH CARE EDUCATION/TRAINING PROGRAM

## 2024-05-03 ASSESSMENT — ENCOUNTER SYMPTOMS
RHINORRHEA: 1
SHORTNESS OF BREATH: 0
COUGH: 1
ABDOMINAL PAIN: 0

## 2024-05-03 NOTE — PROGRESS NOTES
Patient Ila Bunch is a 58 y.o.female who presents with   Chief Complaint   Patient presents with    Cough    Wheezing    Fatigue     Symptoms started last weekend    Facial Pain   Portions of this chart may have been created with voice recognition software. Occasional wrong-word or \"sound-alike\" substitutions may have occurred due to the inherent limitations of voice recognition software. Read the chart carefully and recognize, using context, where these substitutions have occurred      Patient presents for symptoms of acute bronchitis.  Patient states that over the past week she has had some nasal congestion rhinorrhea and postnasal drip.  States that she has been managing her symptoms with over-the-counter medications.  States that she has been sleeping well throughout the night since she has been taking NyQuil.  Currently feeling bothered by the congestion that she feels is in her chest.  Has perceived wheezing.  Has a past medical history of asthma however patient does not have an albuterol inhaler and also she denies any shortness of breath at this time or throughout this week.  Patient denies any fevers but does endorse having felt some chills.  Patient is well-appearing however she complains of fatigue.          Review of Systems   Constitutional:  Positive for fever. Negative for fatigue.   HENT:  Positive for congestion, postnasal drip and rhinorrhea.    Eyes:  Negative for visual disturbance.   Respiratory:  Positive for cough. Negative for shortness of breath.    Cardiovascular:  Negative for chest pain.   Gastrointestinal:  Negative for abdominal pain.   Skin:  Negative for rash.   Neurological:  Negative for dizziness, light-headedness and headaches.        Past Medical History:   Diagnosis Date    Ankle fracture     right - surgery    Ectopic pregnancy        Past Surgical History:   Procedure Laterality Date    ANKLE FRACTURE SURGERY Right      SECTION       &

## 2024-05-09 ENCOUNTER — TELEPHONE (OUTPATIENT)
Dept: INTERNAL MEDICINE CLINIC | Age: 59
End: 2024-05-09

## 2024-05-09 NOTE — TELEPHONE ENCOUNTER
Pt has a persistent productive cough, with yellow-green mucous.  She saw Dr Patel,on May 3rd who ordered a chest xray. Pt did not go for the xray. She wants an antibiotic called in and cough medication.     [id number] - [place], [place] - [address] [place] [address] - P [phone] - F [phone]  [address] [place] [address], [place] [address]  Phone: [phone]  Fax: [phone]     Please advise pt at:  [phone]     Assessment/Plan:     1. Acute bronchitis, unspecified organism  Acute, stable  Malaise, cough and URI symptoms  Physical exam was nonconcerning with normal lung sounds  Advised patient to continue supportive management with over-the-counter medications  Discussed that symptoms should start improving  If fatigue persists the patient is not feeling any better we should consider x-ray to rule out pneumonia  Follow-up as needed

## 2024-05-10 DIAGNOSIS — J06.9 UPPER RESPIRATORY TRACT INFECTION, UNSPECIFIED TYPE: Primary | ICD-10-CM

## 2024-05-10 RX ORDER — CODEINE PHOSPHATE/GUAIFENESIN 10-100MG/5
5 LIQUID (ML) ORAL 2 TIMES DAILY PRN
Qty: 118 ML | Refills: 0 | Status: SHIPPED | OUTPATIENT
Start: 2024-05-10 | End: 2024-05-22

## 2024-05-10 RX ORDER — AMOXICILLIN AND CLAVULANATE POTASSIUM 875; 125 MG/1; MG/1
1 TABLET, FILM COATED ORAL 2 TIMES DAILY
Qty: 14 TABLET | Refills: 0 | Status: SHIPPED | OUTPATIENT
Start: 2024-05-10 | End: 2024-05-17

## 2024-07-16 DIAGNOSIS — F43.9 STRESS: ICD-10-CM

## 2024-07-16 RX ORDER — CITALOPRAM 20 MG/1
TABLET ORAL
Qty: 90 TABLET | Refills: 1 | Status: SHIPPED | OUTPATIENT
Start: 2024-07-16

## 2024-11-07 ENCOUNTER — TELEPHONE (OUTPATIENT)
Dept: INTERNAL MEDICINE CLINIC | Age: 59
End: 2024-11-07

## 2024-11-07 DIAGNOSIS — Z00.00 PREVENTATIVE HEALTH CARE: Primary | ICD-10-CM

## 2024-11-07 DIAGNOSIS — F43.9 STRESS: ICD-10-CM

## 2024-11-07 RX ORDER — CITALOPRAM HYDROBROMIDE 20 MG/1
TABLET ORAL
Qty: 90 TABLET | Refills: 1 | Status: SHIPPED | OUTPATIENT
Start: 2024-11-07

## 2024-11-07 NOTE — TELEPHONE ENCOUNTER
Pt is requesting the following refill. She has a physical appointment on 11/19 but does not have enough medication to last until then     citalopram (CELEXA) 20 MG tablet     [id number] - [place], [place] - [address] [place] [address] - P [phone] - F [phone]

## 2024-12-02 ENCOUNTER — TELEPHONE (OUTPATIENT)
Dept: INTERNAL MEDICINE CLINIC | Age: 59
End: 2024-12-02

## 2024-12-02 NOTE — TELEPHONE ENCOUNTER
Pt is requesting medication for her rash on her finger she has had for 3 weeks now. The finger is red and looks chapped. Pt thinks it may have been caused by socking her nails in acetone at the nail salon.  She has tried over the counter medications and nothing is working.    [phone]     [id number] - [place], [place] - [address] [place] [address] - P [phone] - F [phone]

## 2024-12-02 NOTE — TELEPHONE ENCOUNTER
Needs appointment.  Okay for her to see one of my partners or a nurse practitioner if there are no immediate spots available.

## 2024-12-04 ENCOUNTER — OFFICE VISIT (OUTPATIENT)
Dept: FAMILY MEDICINE CLINIC | Age: 59
End: 2024-12-04
Payer: COMMERCIAL

## 2024-12-04 VITALS
HEART RATE: 88 BPM | DIASTOLIC BLOOD PRESSURE: 82 MMHG | BODY MASS INDEX: 33.01 KG/M2 | TEMPERATURE: 98.5 F | SYSTOLIC BLOOD PRESSURE: 124 MMHG | OXYGEN SATURATION: 97 % | HEIGHT: 60 IN

## 2024-12-04 DIAGNOSIS — T14.8XXA HEMATOMA: ICD-10-CM

## 2024-12-04 DIAGNOSIS — L30.9 DERMATITIS: Primary | ICD-10-CM

## 2024-12-04 PROCEDURE — 99213 OFFICE O/P EST LOW 20 MIN: CPT | Performed by: NURSE PRACTITIONER

## 2024-12-04 RX ORDER — TRIAMCINOLONE ACETONIDE 1 MG/G
OINTMENT TOPICAL 2 TIMES DAILY
Qty: 80 G | Refills: 0 | Status: SHIPPED | OUTPATIENT
Start: 2024-12-04 | End: 2024-12-11

## 2024-12-04 ASSESSMENT — PATIENT HEALTH QUESTIONNAIRE - PHQ9
SUM OF ALL RESPONSES TO PHQ QUESTIONS 1-9: 0
SUM OF ALL RESPONSES TO PHQ9 QUESTIONS 1 & 2: 0
2. FEELING DOWN, DEPRESSED OR HOPELESS: NOT AT ALL
DEPRESSION UNABLE TO ASSESS: FUNCTIONAL CAPACITY MOTIVATION LIMITS ACCURACY
1. LITTLE INTEREST OR PLEASURE IN DOING THINGS: NOT AT ALL
SUM OF ALL RESPONSES TO PHQ QUESTIONS 1-9: 0

## 2024-12-04 ASSESSMENT — ENCOUNTER SYMPTOMS
WHEEZING: 0
CONSTIPATION: 0
DIARRHEA: 0
COUGH: 0
SINUS PAIN: 0
BACK PAIN: 0
COLOR CHANGE: 0
ABDOMINAL PAIN: 0
SINUS PRESSURE: 0
SHORTNESS OF BREATH: 0

## 2024-12-04 NOTE — ASSESSMENT & PLAN NOTE
Acute condition, new, no swelling or tenderness.  Reassurance provided.  No further treatment necessary

## 2024-12-04 NOTE — ASSESSMENT & PLAN NOTE
Acute condition, new, no sign of infection.  Will start triamcinolone twice daily x 1 to 2 weeks.  Continue with 1-2 times daily moisturizer.  Call if no better

## 2024-12-04 NOTE — PROGRESS NOTES
Ila Bunch (:  1965) is a 58 y.o. female,Established patient, here for evaluation of the following chief complaint(s):  Skin Problem (Left small finger)         Assessment & Plan  Dermatitis   Acute condition, new, no sign of infection.  Will start triamcinolone twice daily x 1 to 2 weeks.  Continue with 1-2 times daily moisturizer.  Call if no better         Hematoma   Acute condition, new, no swelling or tenderness.  Reassurance provided.  No further treatment necessary           No follow-ups on file.         Subjective   Skin Problem  Pertinent negatives include no congestion, cough, diarrhea, fatigue, fever or shortness of breath.     Patient is here for concern of redness and flaky skin near nailbed of her left fifth finger.  It started shortly after getting her nails done several weeks ago.  She has been using moisturizer and Polysporin ointment without much relief.  Denies any swelling or drainage.  Redness is not worsening.    She also has concern for a lump on her right lower leg on the inside of the calf muscle that occurred after her dog ran into her several weeks ago.  She did have some bruising surrounding the area which is healing.  She denies any tenderness or pain.  No leg swelling.  It is not worsening.    No Known Allergies    Current Outpatient Medications   Medication Sig Dispense Refill    triamcinolone (KENALOG) 0.1 % ointment Apply topically 2 times daily for 7 days 80 g 0    citalopram (CELEXA) 20 MG tablet TAKE 1 TABLET BY MOUTH EVERY DAY 90 tablet 1    Melatonin 3 MG TABS Take 1 tablet by mouth daily       No current facility-administered medications for this visit.       Review of Systems   Constitutional:  Negative for chills, fatigue and fever.   HENT:  Negative for congestion, sinus pressure and sinus pain.    Respiratory:  Negative for cough, shortness of breath and wheezing.    Cardiovascular:  Negative for chest pain and palpitations.   Gastrointestinal:  Negative

## 2025-02-13 ENCOUNTER — OFFICE VISIT (OUTPATIENT)
Dept: INTERNAL MEDICINE CLINIC | Age: 60
End: 2025-02-13

## 2025-02-13 VITALS
OXYGEN SATURATION: 96 % | DIASTOLIC BLOOD PRESSURE: 80 MMHG | HEART RATE: 86 BPM | WEIGHT: 173.6 LBS | BODY MASS INDEX: 34.08 KG/M2 | TEMPERATURE: 97.2 F | SYSTOLIC BLOOD PRESSURE: 118 MMHG | HEIGHT: 60 IN

## 2025-02-13 DIAGNOSIS — Z23 NEED FOR PNEUMOCOCCAL 20-VALENT CONJUGATE VACCINATION: ICD-10-CM

## 2025-02-13 DIAGNOSIS — Z00.00 ENCOUNTER FOR WELL ADULT EXAM WITHOUT ABNORMAL FINDINGS: Primary | ICD-10-CM

## 2025-02-13 DIAGNOSIS — F43.9 STRESS: ICD-10-CM

## 2025-02-13 DIAGNOSIS — Z23 NEED FOR INFLUENZA VACCINATION: ICD-10-CM

## 2025-02-13 RX ORDER — CITALOPRAM HYDROBROMIDE 20 MG/1
TABLET ORAL
Qty: 90 TABLET | Refills: 1 | Status: SHIPPED | OUTPATIENT
Start: 2025-02-13 | End: 2025-02-13 | Stop reason: SDUPTHER

## 2025-02-13 RX ORDER — CITALOPRAM HYDROBROMIDE 20 MG/1
TABLET ORAL
Qty: 90 TABLET | Refills: 3 | Status: SHIPPED | OUTPATIENT
Start: 2025-02-13

## 2025-02-13 SDOH — ECONOMIC STABILITY: FOOD INSECURITY: WITHIN THE PAST 12 MONTHS, THE FOOD YOU BOUGHT JUST DIDN'T LAST AND YOU DIDN'T HAVE MONEY TO GET MORE.: NEVER TRUE

## 2025-02-13 SDOH — ECONOMIC STABILITY: FOOD INSECURITY: WITHIN THE PAST 12 MONTHS, YOU WORRIED THAT YOUR FOOD WOULD RUN OUT BEFORE YOU GOT MONEY TO BUY MORE.: NEVER TRUE

## 2025-02-13 ASSESSMENT — ENCOUNTER SYMPTOMS
WHEEZING: 0
TROUBLE SWALLOWING: 0
BLOOD IN STOOL: 0
SORE THROAT: 0
VOMITING: 0
SINUS PRESSURE: 0
NAUSEA: 0
CONSTIPATION: 0
CHEST TIGHTNESS: 0
DIARRHEA: 0
VOICE CHANGE: 0
ABDOMINAL PAIN: 0
SHORTNESS OF BREATH: 0
BACK PAIN: 1
COUGH: 0
ABDOMINAL DISTENTION: 0
SINUS PAIN: 0

## 2025-02-13 ASSESSMENT — PATIENT HEALTH QUESTIONNAIRE - PHQ9
SUM OF ALL RESPONSES TO PHQ QUESTIONS 1-9: 0
SUM OF ALL RESPONSES TO PHQ QUESTIONS 1-9: 0
2. FEELING DOWN, DEPRESSED OR HOPELESS: NOT AT ALL
SUM OF ALL RESPONSES TO PHQ QUESTIONS 1-9: 0
1. LITTLE INTEREST OR PLEASURE IN DOING THINGS: NOT AT ALL
SUM OF ALL RESPONSES TO PHQ QUESTIONS 1-9: 0
SUM OF ALL RESPONSES TO PHQ9 QUESTIONS 1 & 2: 0

## 2025-04-24 NOTE — PROGRESS NOTES
Normal heart sounds.   Pulmonary:      Effort: Pulmonary effort is normal. No respiratory distress.      Breath sounds: Normal breath sounds. No wheezing or rales.   Abdominal:      Palpations: Abdomen is soft.      Tenderness: There is no abdominal tenderness. There is no rebound.   Musculoskeletal:         General: No signs of injury. Normal range of motion.      Cervical back: Normal range of motion and neck supple.   Skin:     General: Skin is warm and dry.      Coloration: Skin is not jaundiced.   Neurological:      General: No focal deficit present.      Mental Status: She is alert and oriented to person, place, and time.   Psychiatric:         Behavior: Behavior normal.         Thought Content: Thought content normal.         Judgment: Judgment normal.             Personalized Preventive Plan  Current Health Maintenance Status  Immunization History   Administered Date(s) Administered    COVID-19, PFIZER PURPLE top, DILUTE for use, (age 12 y+), 30mcg/0.3mL 03/16/2021, 04/08/2021, 11/26/2021    Influenza 01/08/2014    Influenza Virus Vaccine 01/08/2014    Influenza, AFLURIA (age 3 y+), FLUZONE, (age 6 mo+), Quadv MDV, 0.5mL 11/18/2015, 01/30/2018    Influenza, FLUAD, (age 65 y+), IM, Quadv, 0.5mL 10/18/2022    Influenza, FLUARIX, FLULAVAL, FLUZONE (age 6 mo+) and AFLURIA, (age 3 y+), Quadv PF, 0.5mL 11/26/2021    Influenza, FLUCELVAX, (age 6 mo+), MDCK, Quadv PF, 0.5mL 09/29/2020, 12/28/2023    TDaP, ADACEL (age 10y-64y), BOOSTRIX (age 10y+), IM, 0.5mL 01/15/2009, 08/23/2017        Health Maintenance Due   Topic Date Due    HIV screen  Never done    Hepatitis C screen  Never done    Hepatitis B vaccine (1 of 3 - 19+ 3-dose series) Never done    Pneumococcal 50+ years Vaccine (1 of 2 - PCV) Never done    Cervical cancer screen  Never done    Shingles vaccine (1 of 2) Never done    Flu vaccine (1) 08/01/2024    COVID-19 Vaccine (4 - 2024-25 season) 09/01/2024      Recommendations for Preventive Services Due: see 
 used